# Patient Record
Sex: FEMALE | Race: WHITE | NOT HISPANIC OR LATINO | URBAN - METROPOLITAN AREA
[De-identification: names, ages, dates, MRNs, and addresses within clinical notes are randomized per-mention and may not be internally consistent; named-entity substitution may affect disease eponyms.]

---

## 2017-03-20 ENCOUNTER — APPOINTMENT (OUTPATIENT)
Dept: URBAN - METROPOLITAN AREA CLINIC 200 | Age: 72
Setting detail: DERMATOLOGY
End: 2017-03-30

## 2017-03-20 DIAGNOSIS — L57.8 OTHER SKIN CHANGES DUE TO CHRONIC EXPOSURE TO NONIONIZING RADIATION: ICD-10-CM

## 2017-03-20 DIAGNOSIS — L57.0 ACTINIC KERATOSIS: ICD-10-CM

## 2017-03-20 PROCEDURE — 17000 DESTRUCT PREMALG LESION: CPT

## 2017-03-20 PROCEDURE — OTHER COUNSELING: OTHER

## 2017-03-20 PROCEDURE — OTHER LIQUID NITROGEN: OTHER

## 2017-03-20 PROCEDURE — 99213 OFFICE O/P EST LOW 20 MIN: CPT | Mod: 25

## 2017-03-20 ASSESSMENT — LOCATION ZONE DERM
LOCATION ZONE: NOSE
LOCATION ZONE: TRUNK

## 2017-03-20 ASSESSMENT — LOCATION DETAILED DESCRIPTION DERM
LOCATION DETAILED: NASAL DORSUM
LOCATION DETAILED: INFERIOR THORACIC SPINE

## 2017-03-20 ASSESSMENT — LOCATION SIMPLE DESCRIPTION DERM
LOCATION SIMPLE: UPPER BACK
LOCATION SIMPLE: NOSE

## 2017-03-20 NOTE — PROCEDURE: LIQUID NITROGEN
Post-Care Instructions: I reviewed with the patient in detail post-care instructions. Patient is to wear sunprotection, and avoid picking at any of the treated lesions. Pt may apply Vaseline to crusted or scabbing areas.
Duration Of Freeze Thaw-Cycle (Seconds): 10
Detail Level: Detailed
Render Post-Care Instructions In Note?: no
Consent: The patient's consent was obtained including but not limited to risks of crusting, scabbing, blistering, scarring, darker or lighter pigmentary change, recurrence, incomplete removal and infection.
Number Of Freeze-Thaw Cycles: 2 freeze-thaw cycles

## 2017-09-18 ENCOUNTER — APPOINTMENT (OUTPATIENT)
Dept: URBAN - METROPOLITAN AREA CLINIC 200 | Age: 72
Setting detail: DERMATOLOGY
End: 2017-09-24

## 2017-09-18 DIAGNOSIS — L57.0 ACTINIC KERATOSIS: ICD-10-CM

## 2017-09-18 DIAGNOSIS — D22 MELANOCYTIC NEVI: ICD-10-CM

## 2017-09-18 DIAGNOSIS — L57.8 OTHER SKIN CHANGES DUE TO CHRONIC EXPOSURE TO NONIONIZING RADIATION: ICD-10-CM

## 2017-09-18 PROBLEM — D22.5 MELANOCYTIC NEVI OF TRUNK: Status: ACTIVE | Noted: 2017-09-18

## 2017-09-18 PROCEDURE — 17000 DESTRUCT PREMALG LESION: CPT

## 2017-09-18 PROCEDURE — OTHER COUNSELING: OTHER

## 2017-09-18 PROCEDURE — OTHER LIQUID NITROGEN: OTHER

## 2017-09-18 PROCEDURE — 99213 OFFICE O/P EST LOW 20 MIN: CPT | Mod: 25

## 2017-09-18 ASSESSMENT — LOCATION ZONE DERM
LOCATION ZONE: TRUNK
LOCATION ZONE: HAND

## 2017-09-18 ASSESSMENT — LOCATION SIMPLE DESCRIPTION DERM
LOCATION SIMPLE: CHEST
LOCATION SIMPLE: LEFT HAND

## 2017-09-18 ASSESSMENT — LOCATION DETAILED DESCRIPTION DERM
LOCATION DETAILED: LEFT ULNAR DORSAL HAND
LOCATION DETAILED: UPPER STERNUM

## 2017-12-14 ENCOUNTER — APPOINTMENT (OUTPATIENT)
Dept: URBAN - METROPOLITAN AREA CLINIC 200 | Age: 72
Setting detail: DERMATOLOGY
End: 2017-12-15

## 2017-12-14 DIAGNOSIS — D485 NEOPLASM OF UNCERTAIN BEHAVIOR OF SKIN: ICD-10-CM

## 2017-12-14 PROBLEM — D48.5 NEOPLASM OF UNCERTAIN BEHAVIOR OF SKIN: Status: ACTIVE | Noted: 2017-12-14

## 2017-12-14 PROCEDURE — OTHER BIOPSY BY SHAVE METHOD: OTHER

## 2017-12-14 PROCEDURE — OTHER RECOMMENDATIONS: OTHER

## 2017-12-14 PROCEDURE — 11100: CPT

## 2017-12-14 ASSESSMENT — LOCATION ZONE DERM
LOCATION ZONE: NOSE
LOCATION ZONE: MUCOUS_MEMBRANE

## 2017-12-14 ASSESSMENT — LOCATION DETAILED DESCRIPTION DERM
LOCATION DETAILED: NASAL DORSUM
LOCATION DETAILED: LEFT NARES

## 2017-12-14 ASSESSMENT — LOCATION SIMPLE DESCRIPTION DERM
LOCATION SIMPLE: NOSE
LOCATION SIMPLE: LEFT NARES

## 2017-12-14 NOTE — PROCEDURE: BIOPSY BY SHAVE METHOD
Wound Care: Vaseline
Bill For Surgical Tray: no
Size Of Lesion In Cm: 0.2
Hemostasis: Drysol
Additional Anesthesia Volume In Cc (Will Not Render If 0): 0
Billing Type: Third-Party Bill
Biopsy Type: H and E
Anesthesia Type: 1% lidocaine with epinephrine
Biopsy Method: Personna blade
Detail Level: Detailed
Type Of Destruction Used: Curettage
Anesthesia Volume In Cc (Will Not Render If 0): 0.1
Post-Care Instructions: I reviewed with the patient in detail post-care instructions. Patient is to keep the biopsy site dry overnight, and then apply bacitracin twice daily until healed. Patient may apply hydrogen peroxide soaks to remove any crusting.
consent was obtained and risks were reviewed including but not limited to scarring, infection, bleeding, scabbing, incomplete removal, nerve damage and allergy to anesthesia.
Dressing: bandage
Notification Instructions: Patient will be notified of biopsy results. However, patient instructed to call the office if not contacted within 2 weeks.

## 2017-12-14 NOTE — PROCEDURE: RECOMMENDATIONS
Recommendations (Free Text): REFER TO DR. ARZOLA ENT FOR EVALUATION.  FAMILY HISTORY OF INTRANASAL BCC PER PT. MOTHER AND BROTHER
Detail Level: Zone
Recommendation Preamble: The following recommendations were made during the visit:

## 2018-03-19 ENCOUNTER — APPOINTMENT (OUTPATIENT)
Dept: URBAN - METROPOLITAN AREA CLINIC 200 | Age: 73
Setting detail: DERMATOLOGY
End: 2018-03-28

## 2018-03-19 DIAGNOSIS — L57.0 ACTINIC KERATOSIS: ICD-10-CM

## 2018-03-19 DIAGNOSIS — L57.8 OTHER SKIN CHANGES DUE TO CHRONIC EXPOSURE TO NONIONIZING RADIATION: ICD-10-CM

## 2018-03-19 PROCEDURE — 99213 OFFICE O/P EST LOW 20 MIN: CPT | Mod: 25

## 2018-03-19 PROCEDURE — OTHER LIQUID NITROGEN: OTHER

## 2018-03-19 PROCEDURE — OTHER COUNSELING: OTHER

## 2018-03-19 PROCEDURE — 17000 DESTRUCT PREMALG LESION: CPT

## 2018-03-19 PROCEDURE — OTHER MIPS QUALITY: OTHER

## 2018-03-19 PROCEDURE — 17003 DESTRUCT PREMALG LES 2-14: CPT

## 2018-03-19 ASSESSMENT — LOCATION DETAILED DESCRIPTION DERM
LOCATION DETAILED: LEFT MEDIAL SUPERIOR CHEST
LOCATION DETAILED: RIGHT NASAL DORSUM
LOCATION DETAILED: UPPER STERNUM

## 2018-03-19 ASSESSMENT — LOCATION SIMPLE DESCRIPTION DERM
LOCATION SIMPLE: NOSE
LOCATION SIMPLE: CHEST

## 2018-03-19 ASSESSMENT — LOCATION ZONE DERM
LOCATION ZONE: NOSE
LOCATION ZONE: TRUNK

## 2018-03-19 ASSESSMENT — PAIN INTENSITY VAS: HOW INTENSE IS YOUR PAIN 0 BEING NO PAIN, 10 BEING THE MOST SEVERE PAIN POSSIBLE?: NO PAIN

## 2018-09-10 ENCOUNTER — APPOINTMENT (OUTPATIENT)
Dept: URBAN - METROPOLITAN AREA CLINIC 200 | Age: 73
Setting detail: DERMATOLOGY
End: 2018-09-21

## 2018-09-10 DIAGNOSIS — L57.0 ACTINIC KERATOSIS: ICD-10-CM

## 2018-09-10 DIAGNOSIS — L57.8 OTHER SKIN CHANGES DUE TO CHRONIC EXPOSURE TO NONIONIZING RADIATION: ICD-10-CM

## 2018-09-10 DIAGNOSIS — Z85.820 PERSONAL HISTORY OF MALIGNANT MELANOMA OF SKIN: ICD-10-CM

## 2018-09-10 PROBLEM — Z85.828 PERSONAL HISTORY OF OTHER MALIGNANT NEOPLASM OF SKIN: Status: ACTIVE | Noted: 2018-09-10

## 2018-09-10 PROCEDURE — 99213 OFFICE O/P EST LOW 20 MIN: CPT | Mod: 25

## 2018-09-10 PROCEDURE — OTHER LIQUID NITROGEN: OTHER

## 2018-09-10 PROCEDURE — 17003 DESTRUCT PREMALG LES 2-14: CPT

## 2018-09-10 PROCEDURE — OTHER COUNSELING: OTHER

## 2018-09-10 PROCEDURE — 17000 DESTRUCT PREMALG LESION: CPT

## 2018-09-10 ASSESSMENT — LOCATION DETAILED DESCRIPTION DERM
LOCATION DETAILED: RIGHT DISTAL POSTERIOR UPPER ARM
LOCATION DETAILED: RIGHT UPPER CUTANEOUS LIP
LOCATION DETAILED: LEFT MEDIAL SUPERIOR CHEST

## 2018-09-10 ASSESSMENT — LOCATION ZONE DERM
LOCATION ZONE: ARM
LOCATION ZONE: TRUNK
LOCATION ZONE: LIP

## 2018-09-10 ASSESSMENT — PAIN INTENSITY VAS: HOW INTENSE IS YOUR PAIN 0 BEING NO PAIN, 10 BEING THE MOST SEVERE PAIN POSSIBLE?: NO PAIN

## 2018-09-10 ASSESSMENT — LOCATION SIMPLE DESCRIPTION DERM
LOCATION SIMPLE: RIGHT POSTERIOR UPPER ARM
LOCATION SIMPLE: CHEST
LOCATION SIMPLE: RIGHT LIP

## 2018-10-25 RX ORDER — VALACYCLOVIR HYDROCHLORIDE 500 MG/1
500 TABLET, FILM COATED ORAL AS NEEDED
COMMUNITY
Start: 2018-09-20 | End: 2019-11-19 | Stop reason: SDUPTHER

## 2018-10-25 RX ORDER — DESLORATADINE 5 MG/1
5 TABLET ORAL DAILY
COMMUNITY
Start: 2018-08-27 | End: 2019-01-15 | Stop reason: SDUPTHER

## 2018-10-25 RX ORDER — METOPROLOL SUCCINATE 50 MG/1
50 TABLET, EXTENDED RELEASE ORAL DAILY
COMMUNITY
Start: 2018-08-27 | End: 2019-01-15 | Stop reason: SDUPTHER

## 2018-10-25 RX ORDER — TIMOLOL MALEATE 5 MG/ML
1 SOLUTION OPHTHALMIC DAILY
COMMUNITY
Start: 2018-08-27

## 2018-10-25 RX ORDER — ATORVASTATIN CALCIUM 10 MG/1
10 TABLET, FILM COATED ORAL NIGHTLY
COMMUNITY
Start: 2018-08-27 | End: 2019-01-15 | Stop reason: SDUPTHER

## 2018-10-25 RX ORDER — LATANOPROST 50 UG/ML
1 SOLUTION/ DROPS OPHTHALMIC DAILY
COMMUNITY
Start: 2018-08-27

## 2018-11-05 ENCOUNTER — HOSPITAL ENCOUNTER (OUTPATIENT)
Dept: CARDIOLOGY | Facility: HOSPITAL | Age: 73
Discharge: HOME | End: 2018-11-05
Attending: OPHTHALMOLOGY
Payer: MEDICARE

## 2018-11-05 ENCOUNTER — APPOINTMENT (OUTPATIENT)
Dept: PREADMISSION TESTING | Facility: HOSPITAL | Age: 73
End: 2018-11-05
Attending: OPHTHALMOLOGY
Payer: MEDICARE

## 2018-11-05 ENCOUNTER — APPOINTMENT (OUTPATIENT)
Dept: LAB | Facility: HOSPITAL | Age: 73
End: 2018-11-05
Attending: OPHTHALMOLOGY
Payer: MEDICARE

## 2018-11-05 ENCOUNTER — TRANSCRIBE ORDERS (OUTPATIENT)
Dept: LAB | Facility: HOSPITAL | Age: 73
End: 2018-11-05

## 2018-11-05 VITALS
HEIGHT: 66 IN | HEART RATE: 62 BPM | SYSTOLIC BLOOD PRESSURE: 124 MMHG | TEMPERATURE: 98 F | DIASTOLIC BLOOD PRESSURE: 60 MMHG | WEIGHT: 175 LBS | RESPIRATION RATE: 16 BRPM | BODY MASS INDEX: 28.12 KG/M2

## 2018-11-05 DIAGNOSIS — H25.12 AGE-RELATED NUCLEAR CATARACT OF LEFT EYE: ICD-10-CM

## 2018-11-05 DIAGNOSIS — Z01.818 PREOP TESTING: Primary | ICD-10-CM

## 2018-11-05 DIAGNOSIS — H25.12 AGE-RELATED NUCLEAR CATARACT OF LEFT EYE: Primary | ICD-10-CM

## 2018-11-05 PROBLEM — H26.9 LEFT CATARACT: Status: ACTIVE | Noted: 2018-11-05

## 2018-11-05 LAB
ANION GAP SERPL CALC-SCNC: 5 MEQ/L (ref 3–15)
BUN SERPL-MCNC: 24 MG/DL (ref 8–20)
CALCIUM SERPL-MCNC: 9.9 MG/DL (ref 8.9–10.3)
CHLORIDE SERPL-SCNC: 103 MEQ/L (ref 98–109)
CO2 SERPL-SCNC: 30 MEQ/L (ref 22–32)
CREAT SERPL-MCNC: 0.9 MG/DL (ref 0.6–1.1)
ERYTHROCYTE [DISTWIDTH] IN BLOOD BY AUTOMATED COUNT: 12.7 % (ref 11.7–14.4)
GFR SERPL CREATININE-BSD FRML MDRD: >60 ML/MIN/1.73M*2
GLUCOSE SERPL-MCNC: 122 MG/DL (ref 70–99)
HCT VFR BLDCO AUTO: 41.7 % (ref 35–45)
HGB BLD-MCNC: 13.9 G/DL (ref 11.8–15.7)
MCH RBC QN AUTO: 29.8 PG (ref 28–33.2)
MCHC RBC AUTO-ENTMCNC: 33.3 G/DL (ref 32.2–35.5)
MCV RBC AUTO: 89.3 FL (ref 83–98)
PDW BLD AUTO: 10.6 FL (ref 9.4–12.3)
PLATELET # BLD AUTO: 204 K/UL (ref 150–369)
POTASSIUM SERPL-SCNC: 4.1 MEQ/L (ref 3.6–5.1)
RBC # BLD AUTO: 4.67 M/UL (ref 3.93–5.22)
SODIUM SERPL-SCNC: 138 MEQ/L (ref 136–144)
WBC # BLD AUTO: 7.21 K/UL (ref 3.8–10.5)

## 2018-11-05 PROCEDURE — 36415 COLL VENOUS BLD VENIPUNCTURE: CPT | Mod: GA

## 2018-11-05 PROCEDURE — 80048 BASIC METABOLIC PNL TOTAL CA: CPT

## 2018-11-05 PROCEDURE — 93005 ELECTROCARDIOGRAM TRACING: CPT

## 2018-11-05 PROCEDURE — 85027 COMPLETE CBC AUTOMATED: CPT | Mod: GZ

## 2018-11-05 RX ORDER — ASPIRIN 81 MG/1
81 TABLET ORAL 2 TIMES WEEKLY
COMMUNITY

## 2018-11-05 ASSESSMENT — ENCOUNTER SYMPTOMS
SHORTNESS OF BREATH: 0
NUMBNESS: 0
DYSURIA: 0
HEMATOLOGIC/LYMPHATIC NEGATIVE: 1
HEMATURIA: 0
WEAKNESS: 0
CHILLS: 0
VOMITING: 0
ENDOCRINE COMMENTS: PREDIABETIC
PALPITATIONS: 1
CHEST TIGHTNESS: 0
APNEA: 0
PSYCHIATRIC NEGATIVE: 1
ROS GI COMMENTS: GERD CONTROLLED
ABDOMINAL PAIN: 0
ENDOCRINE NEGATIVE: 1
MUSCULOSKELETAL NEGATIVE: 1
HEADACHES: 1
FEVER: 0
WOUND: 0
NAUSEA: 0

## 2018-11-05 ASSESSMENT — PAIN SCALES - GENERAL: PAINLEVEL: 0-NO PAIN

## 2018-11-05 NOTE — PRE-PROCEDURE INSTRUCTIONS
1. We will call you between 4pm to 7pm on the date before your surgery to determine that arrival time for your procedure.   2. Please report to (the hospital will tell you when they call) on the day of your procedure.   3. Please follow the following fasting guidelines:   · Nothing to eat or drink 8 hours prior to arrival   4. Early on the morning of the procedure please take your usual dose of the listed medications with a sip of water:  Toprol, Zantac   5. Other Instructions: follow your surgeon's eye drop instructions   6. If you develop a cold, cough, fever, rash, or other symptom prior to the data of the procedure, please report it to your physician immediately.   7. If you need to cancel the procedure for any reason, please contact your physician or call the unit listed above.   8. Make arrangements to have someone drive you home from the procedure. If you have not arranged for transportation home, your surgery may be cancelled.    9. You may not take public transportation unless accompanied by a responsible person.   10. You may not drive a car or operate complex or potentially dangerous machinery for 24 hours following anesthesia and/or sedation.   11. If it is medically necessary for you to have a longer stay, you will be informed as soon as the decision is made.   12. Do not wear or being anything of value to the hospital including jewelry of any kind. Do not wear make-up or contact lenses. DO bring your glasses and hearing aid.   13. Dress in comfortable clothes.   14.  If instructed, please bring a copy of your Advanced Directive (Living Will/Durable Power of ) on the day of your procedure.      Pre operative instructions given as per protocol.  Form explained by: MARIANNE Farrell     I have read and understand the above information. I have had sufficient opportunity to ask questions I might have and they have been answered to my satisfaction. I agree to comply with the Patient  Responsibilities listed above and have received a copy of this form.

## 2018-11-05 NOTE — H&P
"Chief complaint: left cataract  HPI: 74 yo female with mature left cataract, she states she has difficulty focusing with her left eye, she notes glare and stopped driving at night, she states she has a history of glaucoma and retina cysts left eye  Allergies:   Allergies   Allergen Reactions   • Adhesive Tape-Silicones Other (see comments)     \"Skin Burn\"   • Ambien [Zolpidem] Other (see comments)     Amnesia   • Dexilant [Dexlansoprazole] GI intolerance   • Sulfa (Sulfonamide Antibiotics) Rash     Patient's Meds:   Current Outpatient Prescriptions:   •  aspirin 81 mg enteric coated tablet, Take 81 mg by mouth 2 (two) times a week (Mon, Thu). Monday and Thursday, Disp: , Rfl:   •  ranitidine (ZANTAC) 300 mg tablet, Take 300 mg by mouth 2 (two) times a day., Disp: , Rfl:   •  atorvastatin (LIPITOR) 10 mg tablet, Take 10 mg by mouth nightly.  , Disp: , Rfl:   •  desloratadine (CLARINEX) 5 mg tablet, Take 5 mg by mouth daily.  , Disp: , Rfl:   •  latanoprost (XALATAN) 0.005 % ophthalmic solution, Administer 1 drop into both eyes daily.  , Disp: , Rfl:   •  timolol (TIMOPTIC-XR) 0.5 % ophthalmic gel-forming, Administer 1 drop into both eyes daily.  , Disp: , Rfl:   •  TOPROL XL 50 mg 24 hr tablet, Take 50 mg by mouth daily.  , Disp: , Rfl:   •  valACYclovir (VALTREX) 500 mg tablet, Take 500 mg by mouth as needed.  , Disp: , Rfl:   Medical History:   Past Medical History:   Diagnosis Date   • Allergic rhinitis    • Calculus of kidney     Spontaneously passed   • Cataracts, bilateral    • Chronic pain     Back Pain   • Esophagitis, reflux    • Essential hypertension, benign    • Fatty liver    • Glaucoma    • H/O cardiovascular stress test    • History of colon polyps    • Impaired fasting glucose     Blood Glucose usually 115-124 in am   • Macular degeneration    • Malignant melanoma (CMS/HCC) (HCC) 2010    Left Arm   • Migraines     Last migraine 30 years ago   • Overweight    • Paroxysmal supraventricular tachycardia " (CMS/HCC)     Controlled with Toprol   • Prediabetes    • Pure hypercholesterolemia    • Skin cancer     Squamous cell Face   • Small bowel obstruction (CMS/HCC) (HCC)     Caused by Scar tissue from Tubal Ligation   • UTI (urinary tract infection) 04/2018    Develped an SVT with UTI and followed by cardiology     Surgical History:   Past Surgical History:   Procedure Laterality Date   • ABDOMINAL SURGERY  07/2012    Small Bowel Resection   • CATARACT EXTRACTION Right 01/2016   • KNEE CARTILAGE SURGERY Left 11/2008   • SKIN CANCER EXCISION Left 2010    Melanoma   • TUBAL LIGATION  1985     Social History:   Social History     Social History   • Marital status:      Spouse name: N/A   • Number of children: N/A   • Years of education: N/A     Social History Main Topics   • Smoking status: Never Smoker   • Smokeless tobacco: Never Used   • Alcohol use No   • Drug use: No   • Sexual activity: Not Asked     Other Topics Concern   • None     Social History Narrative   • None     Review of Systems   Constitutional: Negative for chills and fever.   HENT: Negative for dental problem and hearing loss.         Infrequent oral herpes lesions- takes Valtrex prn   Eyes: Positive for visual disturbance (left eye see HPI).   Respiratory: Negative for apnea, chest tightness and shortness of breath.    Cardiovascular: Positive for palpitations (arrhythmia- SVT controlled with Toprol). Negative for chest pain and leg swelling.   Gastrointestinal: Negative for abdominal pain, nausea and vomiting.        GERD controlled   Endocrine: Negative.         Prediabetic   Genitourinary: Negative for dysuria and hematuria.   Musculoskeletal: Negative.    Skin: Negative for wound.   Neurological: Positive for headaches (migraines 30 years ago). Negative for weakness and numbness.   Hematological: Negative.    Psychiatric/Behavioral: Negative.      Vitals:   Vitals:    11/05/18 1357   BP: 124/60   Pulse: 62   Resp: 16   Temp: 36.7 °C (98  °F)     Body mass index is 28.25 kg/m².  Physical Exam   Constitutional: She is oriented to person, place, and time. She appears well-developed and well-nourished.   HENT:   Head: Normocephalic and atraumatic.   Mouth/Throat: Oropharynx is clear and moist.   Eyes: Conjunctivae are normal. Pupils are equal, round, and reactive to light.   Neck: Normal range of motion. Neck supple. No thyromegaly present.   Cardiovascular: Normal rate, regular rhythm, normal heart sounds and intact distal pulses.    No murmur heard.  Pulmonary/Chest: Effort normal and breath sounds normal. No respiratory distress.   Abdominal: Soft. Bowel sounds are normal. She exhibits no mass. There is no tenderness.   Musculoskeletal: Normal range of motion.   Lymphadenopathy:     She has no cervical adenopathy.   Neurological: She is alert and oriented to person, place, and time.   Skin: Skin is warm and dry.   Psychiatric: She has a normal mood and affect. Her behavior is normal. Thought content normal.   Vitals reviewed.  labs pending  EKG SR 60 BPM LAD possible LAFB LVH  Patient Active Problem List   Diagnosis   • Allergic rhinitis   • Calculus of kidney   • Chronic pain   • Esophagitis, reflux   • Essential hypertension, benign   • Fatty liver   • History of colon polyps   • Impaired fasting glucose   • Overweight   • Paroxysmal supraventricular tachycardia (CMS/HCC)   • Pure hypercholesterolemia   • Malignant melanoma (CMS/HCC) (HCC)   • Left cataract         Assessment/Plan:  Left cataract removal IOL implant  Medical clearance  NPO for surgery

## 2018-11-06 ENCOUNTER — CONSULT (OUTPATIENT)
Dept: PRIMARY CARE | Facility: CLINIC | Age: 73
End: 2018-11-06
Payer: MEDICARE

## 2018-11-06 VITALS
HEART RATE: 65 BPM | WEIGHT: 176 LBS | DIASTOLIC BLOOD PRESSURE: 82 MMHG | BODY MASS INDEX: 29.32 KG/M2 | OXYGEN SATURATION: 98 % | SYSTOLIC BLOOD PRESSURE: 132 MMHG | HEIGHT: 65 IN

## 2018-11-06 DIAGNOSIS — K21.00 ESOPHAGITIS, REFLUX: ICD-10-CM

## 2018-11-06 DIAGNOSIS — Z01.818 PREOPERATIVE EVALUATION TO RULE OUT SURGICAL CONTRAINDICATION: ICD-10-CM

## 2018-11-06 DIAGNOSIS — E78.00 PURE HYPERCHOLESTEROLEMIA: ICD-10-CM

## 2018-11-06 DIAGNOSIS — H25.12 AGE-RELATED NUCLEAR CATARACT OF LEFT EYE: ICD-10-CM

## 2018-11-06 DIAGNOSIS — R73.01 IMPAIRED FASTING GLUCOSE: Primary | ICD-10-CM

## 2018-11-06 DIAGNOSIS — N20.0 CALCULUS OF KIDNEY: ICD-10-CM

## 2018-11-06 LAB
ATRIAL RATE: 60
P AXIS: 4
PR INTERVAL: 158
QRS DURATION: 106
QT INTERVAL: 442
QTC CALCULATION(BAZETT): 442
R AXIS: -44
T WAVE AXIS: -7
VENTRICULAR RATE: 60

## 2018-11-06 PROCEDURE — 99215 OFFICE O/P EST HI 40 MIN: CPT | Performed by: INTERNAL MEDICINE

## 2018-11-06 ASSESSMENT — ENCOUNTER SYMPTOMS
BACK PAIN: 1
CARDIOVASCULAR NEGATIVE: 1
PSYCHIATRIC NEGATIVE: 1
PHOTOPHOBIA: 1
NEUROLOGICAL NEGATIVE: 1
CONSTITUTIONAL NEGATIVE: 1
RESPIRATORY NEGATIVE: 1
HEMATOLOGIC/LYMPHATIC NEGATIVE: 1
GASTROINTESTINAL NEGATIVE: 1

## 2018-11-06 NOTE — PROGRESS NOTES
"    [facility]  Dr. Veronica Mars  [address], [address]  [place] [address]  Phone: [phone]  Fax: [phone]      Visit Date: 11/6/18    Patient ID: Bev Martinez is a 73 y.o. female.  Surgeon: Dr. Gumaro Ramires  Facility: [facility]  Fax: [phone]    Chief Complaint: Pre-op Exam (left eye cataract with IOL per Dr. Gumaro Ramires at [place] on 11/12/18)      Patient Active Problem List   Diagnosis   • Allergic rhinitis   • Calculus of kidney   • Esophagitis, reflux   • Fatty liver   • History of colon polyps   • Impaired fasting glucose   • Overweight   • Paroxysmal supraventricular tachycardia (CMS/HCC)   • Pure hypercholesterolemia   • Malignant melanoma (CMS/HCC) (HCC)   • Left cataract       Planning a left cataract extraction and IOL implantation. She has releaiely poor vision in the left eye related to a retinal problem. It is hoped that the decreasing glare related to the cataract will improve her vision in this eye.              Current Outpatient Prescriptions:   •  aspirin 81 mg enteric coated tablet, Take 81 mg by mouth 2 (two) times a week (Mon, Thu). Monday and Thursday, Disp: , Rfl:   •  atorvastatin (LIPITOR) 10 mg tablet, Take 10 mg by mouth nightly.  , Disp: , Rfl:   •  desloratadine (CLARINEX) 5 mg tablet, Take 5 mg by mouth daily.  , Disp: , Rfl:   •  latanoprost (XALATAN) 0.005 % ophthalmic solution, Administer 1 drop into both eyes daily.  , Disp: , Rfl:   •  timolol (TIMOPTIC-XR) 0.5 % ophthalmic gel-forming, Administer 1 drop into both eyes daily.  , Disp: , Rfl:   •  TOPROL XL 50 mg 24 hr tablet, Take 50 mg by mouth daily.  , Disp: , Rfl:   •  valACYclovir (VALTREX) 500 mg tablet, Take 500 mg by mouth as needed.  , Disp: , Rfl:   •  ranitidine (ZANTAC) 300 mg tablet, TAKE 1 TABLET TWICE A DAY, Disp: 180 tablet, Rfl: 3    Allergies   Allergen Reactions   • Adhesive Tape-Silicones Other (see comments)     \"Skin Burn\" "   • Ambien [Zolpidem] Other (see comments)     Amnesia   • Dexilant [Dexlansoprazole] GI intolerance   • Sulfa (Sulfonamide Antibiotics) Rash       Past Medical History:   Diagnosis Date   • Allergic rhinitis    • Calculus of kidney     Spontaneously passed   • Cataracts, bilateral    • Chronic pain     Back Pain   • Esophagitis, reflux    • Essential hypertension, benign    • Fatty liver    • Glaucoma    • H/O cardiovascular stress test    • History of colon polyps    • Impaired fasting glucose     Blood Glucose usually 115-124 in am   • Macular degeneration    • Malignant melanoma (CMS/HCC) (HCC) 2010    Left Arm   • Migraines     Last migraine 30 years ago   • Overweight    • Paroxysmal supraventricular tachycardia (CMS/HCC)     Controlled with Toprol   • Prediabetes    • Pure hypercholesterolemia    • Skin cancer     Squamous cell Face   • Small bowel obstruction (CMS/HCC) (HCC)     Caused by Scar tissue from Tubal Ligation   • UTI (urinary tract infection) 04/2018    Develped an SVT with UTI and followed by cardiology       Past Surgical History:   Procedure Laterality Date   • ABDOMINAL SURGERY  07/2012    Small Bowel Resection   • CATARACT EXTRACTION Right 01/2016   • KNEE CARTILAGE SURGERY Left 11/2008   • SKIN CANCER EXCISION Left 2009    Melanoma   • TUBAL LIGATION         Social History     Social History   • Marital status:      Spouse name: N/A   • Number of children: N/A   • Years of education: N/A     Occupational History   • retired - GYN CRNP      Social History Main Topics   • Smoking status: Never Smoker   • Smokeless tobacco: Never Used   • Alcohol use No   • Drug use: No   • Sexual activity: Not on file     Other Topics Concern   • Not on file     Social History Narrative   • No narrative on file       Family History   Problem Relation Age of Onset   • Heart disease Mother    • COPD Father    • Glaucoma Sister    • Heart disease Brother    • Diabetes Brother    • Glaucoma Brother    •  "Stroke Maternal Grandfather    • Glaucoma Sister    • Eating disorder Sister    • Glaucoma Brother         Review of Systems   Constitutional: Negative.    HENT: Negative.  Negative for dental problem (has crowns).    Eyes: Positive for photophobia (just glare from headlights) and visual disturbance (chronic left eye). Negative for pain, discharge, redness and itching.   Respiratory: Negative.    Cardiovascular: Negative.    Gastrointestinal: Negative.    Endocrine: Positive for heat intolerance (not new). Negative for cold intolerance, polydipsia and polyuria.   Genitourinary: Negative.    Musculoskeletal: Positive for back pain (occasionally). Negative for arthralgias, gait problem, joint swelling, myalgias, neck pain and neck stiffness.   Skin: Negative.    Allergic/Immunologic: Positive for environmental allergies. Negative for food allergies and immunocompromised state.   Neurological: Negative.    Hematological: Negative.    Psychiatric/Behavioral: Negative.        Vitals:    11/06/18 0813   BP: 132/82   Pulse: 65   SpO2: 98%   Weight: 79.8 kg (176 lb)   Height: 1.651 m (5' 5\")       Body mass index is 29.29 kg/m².      Physical Exam   Constitutional: She is oriented to person, place, and time. She appears well-nourished.   HENT:   Head: Normocephalic.   Right Ear: Hearing, tympanic membrane, external ear and ear canal normal.   Left Ear: Hearing, tympanic membrane, external ear and ear canal normal.   Nose: Nose normal.   Mouth/Throat: Uvula is midline, oropharynx is clear and moist and mucous membranes are normal. Normal dentition (bu crowns noted).   Eyes: Conjunctivae, EOM and lids are normal. Pupils are equal, round, and reactive to light.   Neck: Trachea normal. Neck supple. Normal carotid pulses present. Carotid bruit is not present. No thyromegaly present.   Cardiovascular: Normal rate, regular rhythm and normal heart sounds.  Exam reveals no gallop.    No murmur heard.  Pulmonary/Chest: Effort normal " and breath sounds normal. She has no wheezes. She has no rales.   Abdominal: Soft. Bowel sounds are normal. She exhibits no mass. There is no tenderness.   Musculoskeletal: Normal range of motion. She exhibits no edema.   Lymphadenopathy:     She has no cervical adenopathy.   Neurological: She is oriented to person, place, and time. Coordination normal.   Skin: Skin is warm and dry. No pallor.   Psychiatric: She has a normal mood and affect. Her behavior is normal. Judgment and thought content normal.   Vitals reviewed.         Assessment/Plan     Problem List Items Addressed This Visit        Other    Calculus of kidney    Relevant Orders    Urinalysis with Reflex Culture    Esophagitis, reflux     CBC normal 4/18.         Relevant Orders    CBC and differential    Impaired fasting glucose - Primary     A1c 5.9 in 4/18, CMP notable only for a FBS of 125.         Relevant Orders    Hemoglobin A1c    Pure hypercholesterolemia     At goal last check.         Relevant Orders    Comprehensive metabolic panel    Lipid panel    Left cataract     Patient is without cardiovascular complaints.  Her exam is stable.  Her EKG done at [facility] 11/5/18 is without significant change from previous tracings.  (The lab work ordered today is not expected to be done before the procedure.)  The patient appears to be at acceptable risk for the planned procedure.           Other Visit Diagnoses     Preoperative evaluation to rule out surgical contraindication              There are no Patient Instructions on file for this visit.    Return in about 6 months (around 5/6/2019).      Veronica Mars MD  11/8/2018

## 2018-11-07 ENCOUNTER — TELEPHONE (OUTPATIENT)
Dept: PRIMARY CARE | Facility: CLINIC | Age: 73
End: 2018-11-07

## 2018-11-08 ASSESSMENT — ENCOUNTER SYMPTOMS
EYE ITCHING: 0
EYE DISCHARGE: 0
JOINT SWELLING: 0
ARTHRALGIAS: 0
POLYDIPSIA: 0
MYALGIAS: 0
EYE REDNESS: 0
NECK PAIN: 0
NECK STIFFNESS: 0
EYE PAIN: 0

## 2018-11-08 NOTE — TELEPHONE ENCOUNTER
S/W pt-aware and also wanted you to know that when you were discussing her April labs with here-she did have a UTI at that time and perhaps that was the reason her glucose was elevated

## 2018-11-08 NOTE — ASSESSMENT & PLAN NOTE
Patient is without cardiovascular complaints.  Her exam is stable.  Her EKG done at [facility] 11/5/18 is without significant change from previous tracings.  (The lab work ordered today is not expected to be done before the procedure.)  The patient appears to be at acceptable risk for the planned procedure.

## 2018-11-12 ENCOUNTER — HOSPITAL ENCOUNTER (OUTPATIENT)
Facility: HOSPITAL | Age: 73
Setting detail: HOSPITAL OUTPATIENT SURGERY
Discharge: HOME | End: 2018-11-12
Attending: OPHTHALMOLOGY | Admitting: OPHTHALMOLOGY
Payer: MEDICARE

## 2018-11-12 ENCOUNTER — ANESTHESIA EVENT (OUTPATIENT)
Dept: OPERATING ROOM | Facility: HOSPITAL | Age: 73
Setting detail: HOSPITAL OUTPATIENT SURGERY
End: 2018-11-12
Payer: MEDICARE

## 2018-11-12 VITALS
SYSTOLIC BLOOD PRESSURE: 114 MMHG | RESPIRATION RATE: 16 BRPM | BODY MASS INDEX: 29.32 KG/M2 | TEMPERATURE: 97 F | DIASTOLIC BLOOD PRESSURE: 55 MMHG | OXYGEN SATURATION: 98 % | WEIGHT: 176 LBS | HEIGHT: 65 IN | HEART RATE: 66 BPM

## 2018-11-12 PROCEDURE — 63600000 HC DRUGS/DETAIL CODE: Performed by: OPHTHALMOLOGY

## 2018-11-12 PROCEDURE — 25000000 HC PHARMACY GENERAL: Performed by: OPHTHALMOLOGY

## 2018-11-12 PROCEDURE — 71000012 HC PACU PHASE 2 EA ADDL MIN: Performed by: OPHTHALMOLOGY

## 2018-11-12 PROCEDURE — 63600000 HC DRUGS/DETAIL CODE: Performed by: NURSE ANESTHETIST, CERTIFIED REGISTERED

## 2018-11-12 PROCEDURE — 63700000 HC SELF-ADMINISTRABLE DRUG: Performed by: OPHTHALMOLOGY

## 2018-11-12 PROCEDURE — 71000002 HC PACU PHASE 2 INITIAL 30MIN: Performed by: OPHTHALMOLOGY

## 2018-11-12 PROCEDURE — 08RK3JZ REPLACEMENT OF LEFT LENS WITH SYNTHETIC SUBSTITUTE, PERCUTANEOUS APPROACH: ICD-10-PCS | Performed by: OPHTHALMOLOGY

## 2018-11-12 PROCEDURE — 36000003 HC OR LEVEL 3 INITIAL 30MIN: Performed by: OPHTHALMOLOGY

## 2018-11-12 PROCEDURE — V2632 POST CHMBR INTRAOCULAR LENS: HCPCS | Performed by: OPHTHALMOLOGY

## 2018-11-12 PROCEDURE — 37000002 HC ANESTHESIA MAC: Performed by: OPHTHALMOLOGY

## 2018-11-12 DEVICE — LENS IOL SN60WF 23.0D: Type: IMPLANTABLE DEVICE | Status: FUNCTIONAL

## 2018-11-12 RX ORDER — ONDANSETRON HYDROCHLORIDE 2 MG/ML
INJECTION, SOLUTION INTRAVENOUS AS NEEDED
Status: DISCONTINUED | OUTPATIENT
Start: 2018-11-12 | End: 2018-11-12 | Stop reason: SURG

## 2018-11-12 RX ORDER — TOBRAMYCIN AND DEXAMETHASONE 3; 1 MG/ML; MG/ML
SUSPENSION/ DROPS OPHTHALMIC AS NEEDED
Status: DISCONTINUED | OUTPATIENT
Start: 2018-11-12 | End: 2018-11-12 | Stop reason: HOSPADM

## 2018-11-12 RX ORDER — FENTANYL CITRATE 50 UG/ML
INJECTION, SOLUTION INTRAMUSCULAR; INTRAVENOUS AS NEEDED
Status: DISCONTINUED | OUTPATIENT
Start: 2018-11-12 | End: 2018-11-12 | Stop reason: SURG

## 2018-11-12 RX ORDER — TETRACAINE HYDROCHLORIDE 5 MG/ML
1 SOLUTION OPHTHALMIC ONCE AS NEEDED
Status: DISCONTINUED | OUTPATIENT
Start: 2018-11-12 | End: 2018-11-12 | Stop reason: HOSPADM

## 2018-11-12 RX ORDER — SODIUM CHLORIDE, SODIUM LACTATE, POTASSIUM CHLORIDE, CALCIUM CHLORIDE 600; 310; 30; 20 MG/100ML; MG/100ML; MG/100ML; MG/100ML
INJECTION, SOLUTION INTRAVENOUS CONTINUOUS
Status: DISCONTINUED | OUTPATIENT
Start: 2018-11-12 | End: 2018-11-12 | Stop reason: HOSPADM

## 2018-11-12 RX ORDER — TETRACAINE HYDROCHLORIDE 5 MG/ML
1 SOLUTION OPHTHALMIC
Status: DISCONTINUED | OUTPATIENT
Start: 2018-11-12 | End: 2018-11-12 | Stop reason: HOSPADM

## 2018-11-12 RX ORDER — TROPICAMIDE 10 MG/ML
1 SOLUTION/ DROPS OPHTHALMIC
Status: DISCONTINUED | OUTPATIENT
Start: 2018-11-12 | End: 2018-11-12 | Stop reason: HOSPADM

## 2018-11-12 RX ORDER — ACETAMINOPHEN 325 MG/1
650 TABLET ORAL EVERY 4 HOURS PRN
Status: DISCONTINUED | OUTPATIENT
Start: 2018-11-12 | End: 2018-11-12 | Stop reason: HOSPADM

## 2018-11-12 RX ORDER — PILOCARPINE HYDROCHLORIDE 20 MG/ML
SOLUTION/ DROPS OPHTHALMIC AS NEEDED
Status: DISCONTINUED | OUTPATIENT
Start: 2018-11-12 | End: 2018-11-12 | Stop reason: HOSPADM

## 2018-11-12 RX ORDER — MIDAZOLAM HYDROCHLORIDE 2 MG/2ML
INJECTION, SOLUTION INTRAMUSCULAR; INTRAVENOUS AS NEEDED
Status: DISCONTINUED | OUTPATIENT
Start: 2018-11-12 | End: 2018-11-12 | Stop reason: SURG

## 2018-11-12 RX ORDER — PHENYLEPHRINE HYDROCHLORIDE 25 MG/ML
1 SOLUTION/ DROPS OPHTHALMIC
Status: DISCONTINUED | OUTPATIENT
Start: 2018-11-12 | End: 2018-11-12 | Stop reason: HOSPADM

## 2018-11-12 RX ORDER — CYCLOPENTOLATE HYDROCHLORIDE 10 MG/ML
1 SOLUTION/ DROPS OPHTHALMIC
Status: DISCONTINUED | OUTPATIENT
Start: 2018-11-12 | End: 2018-11-12 | Stop reason: HOSPADM

## 2018-11-12 RX ORDER — EPINEPHRINE 1 MG/ML
INJECTION, SOLUTION INTRAMUSCULAR; SUBCUTANEOUS AS NEEDED
Status: DISCONTINUED | OUTPATIENT
Start: 2018-11-12 | End: 2018-11-12 | Stop reason: HOSPADM

## 2018-11-12 RX ADMIN — CYCLOPENTOLATE HYDROCHLORIDE 1 DROP: 10 SOLUTION OPHTHALMIC at 09:09

## 2018-11-12 RX ADMIN — PHENYLEPHRINE HYDROCHLORIDE 1 DROP: 2.5 SOLUTION/ DROPS OPHTHALMIC at 09:09

## 2018-11-12 RX ADMIN — MIDAZOLAM HYDROCHLORIDE 2 MG: 1 INJECTION, SOLUTION INTRAMUSCULAR; INTRAVENOUS at 09:50

## 2018-11-12 RX ADMIN — ONDANSETRON 4 MG: 2 INJECTION INTRAMUSCULAR; INTRAVENOUS at 10:00

## 2018-11-12 RX ADMIN — TROPICAMIDE 1 DROP: 10 SOLUTION/ DROPS OPHTHALMIC at 09:09

## 2018-11-12 RX ADMIN — SODIUM CHLORIDE, POTASSIUM CHLORIDE, SODIUM LACTATE AND CALCIUM CHLORIDE: 600; 310; 30; 20 INJECTION, SOLUTION INTRAVENOUS at 09:20

## 2018-11-12 RX ADMIN — TETRACAINE HYDROCHLORIDE 1 DROP: 5 SOLUTION OPHTHALMIC at 09:09

## 2018-11-12 RX ADMIN — FENTANYL CITRATE 50 MCG: 50 INJECTION, SOLUTION INTRAMUSCULAR; INTRAVENOUS at 10:00

## 2018-11-12 ASSESSMENT — PAIN - FUNCTIONAL ASSESSMENT
PAIN_FUNCTIONAL_ASSESSMENT: NO/DENIES PAIN

## 2018-11-12 ASSESSMENT — ENCOUNTER SYMPTOMS: HEADACHES: 1

## 2018-11-12 NOTE — H&P (VIEW-ONLY)
"Chief complaint: left cataract  HPI: 72 yo female with mature left cataract, she states she has difficulty focusing with her left eye, she notes glare and stopped driving at night, she states she has a history of glaucoma and retina cysts left eye  Allergies:   Allergies   Allergen Reactions   • Adhesive Tape-Silicones Other (see comments)     \"Skin Burn\"   • Ambien [Zolpidem] Other (see comments)     Amnesia   • Dexilant [Dexlansoprazole] GI intolerance   • Sulfa (Sulfonamide Antibiotics) Rash     Patient's Meds:   Current Outpatient Prescriptions:   •  aspirin 81 mg enteric coated tablet, Take 81 mg by mouth 2 (two) times a week (Mon, Thu). Monday and Thursday, Disp: , Rfl:   •  ranitidine (ZANTAC) 300 mg tablet, Take 300 mg by mouth 2 (two) times a day., Disp: , Rfl:   •  atorvastatin (LIPITOR) 10 mg tablet, Take 10 mg by mouth nightly.  , Disp: , Rfl:   •  desloratadine (CLARINEX) 5 mg tablet, Take 5 mg by mouth daily.  , Disp: , Rfl:   •  latanoprost (XALATAN) 0.005 % ophthalmic solution, Administer 1 drop into both eyes daily.  , Disp: , Rfl:   •  timolol (TIMOPTIC-XR) 0.5 % ophthalmic gel-forming, Administer 1 drop into both eyes daily.  , Disp: , Rfl:   •  TOPROL XL 50 mg 24 hr tablet, Take 50 mg by mouth daily.  , Disp: , Rfl:   •  valACYclovir (VALTREX) 500 mg tablet, Take 500 mg by mouth as needed.  , Disp: , Rfl:   Medical History:   Past Medical History:   Diagnosis Date   • Allergic rhinitis    • Calculus of kidney     Spontaneously passed   • Cataracts, bilateral    • Chronic pain     Back Pain   • Esophagitis, reflux    • Essential hypertension, benign    • Fatty liver    • Glaucoma    • H/O cardiovascular stress test    • History of colon polyps    • Impaired fasting glucose     Blood Glucose usually 115-124 in am   • Macular degeneration    • Malignant melanoma (CMS/HCC) (HCC) 2010    Left Arm   • Migraines     Last migraine 30 years ago   • Overweight    • Paroxysmal supraventricular tachycardia " (CMS/HCC)     Controlled with Toprol   • Prediabetes    • Pure hypercholesterolemia    • Skin cancer     Squamous cell Face   • Small bowel obstruction (CMS/HCC) (HCC)     Caused by Scar tissue from Tubal Ligation   • UTI (urinary tract infection) 04/2018    Develped an SVT with UTI and followed by cardiology     Surgical History:   Past Surgical History:   Procedure Laterality Date   • ABDOMINAL SURGERY  07/2012    Small Bowel Resection   • CATARACT EXTRACTION Right 01/2016   • KNEE CARTILAGE SURGERY Left 11/2008   • SKIN CANCER EXCISION Left 2010    Melanoma   • TUBAL LIGATION  1985     Social History:   Social History     Social History   • Marital status:      Spouse name: N/A   • Number of children: N/A   • Years of education: N/A     Social History Main Topics   • Smoking status: Never Smoker   • Smokeless tobacco: Never Used   • Alcohol use No   • Drug use: No   • Sexual activity: Not Asked     Other Topics Concern   • None     Social History Narrative   • None     Review of Systems   Constitutional: Negative for chills and fever.   HENT: Negative for dental problem and hearing loss.         Infrequent oral herpes lesions- takes Valtrex prn   Eyes: Positive for visual disturbance (left eye see HPI).   Respiratory: Negative for apnea, chest tightness and shortness of breath.    Cardiovascular: Positive for palpitations (arrhythmia- SVT controlled with Toprol). Negative for chest pain and leg swelling.   Gastrointestinal: Negative for abdominal pain, nausea and vomiting.        GERD controlled   Endocrine: Negative.         Prediabetic   Genitourinary: Negative for dysuria and hematuria.   Musculoskeletal: Negative.    Skin: Negative for wound.   Neurological: Positive for headaches (migraines 30 years ago). Negative for weakness and numbness.   Hematological: Negative.    Psychiatric/Behavioral: Negative.      Vitals:   Vitals:    11/05/18 1357   BP: 124/60   Pulse: 62   Resp: 16   Temp: 36.7 °C (98  °F)     Body mass index is 28.25 kg/m².  Physical Exam   Constitutional: She is oriented to person, place, and time. She appears well-developed and well-nourished.   HENT:   Head: Normocephalic and atraumatic.   Mouth/Throat: Oropharynx is clear and moist.   Eyes: Conjunctivae are normal. Pupils are equal, round, and reactive to light.   Neck: Normal range of motion. Neck supple. No thyromegaly present.   Cardiovascular: Normal rate, regular rhythm, normal heart sounds and intact distal pulses.    No murmur heard.  Pulmonary/Chest: Effort normal and breath sounds normal. No respiratory distress.   Abdominal: Soft. Bowel sounds are normal. She exhibits no mass. There is no tenderness.   Musculoskeletal: Normal range of motion.   Lymphadenopathy:     She has no cervical adenopathy.   Neurological: She is alert and oriented to person, place, and time.   Skin: Skin is warm and dry.   Psychiatric: She has a normal mood and affect. Her behavior is normal. Thought content normal.   Vitals reviewed.  labs pending  EKG SR 60 BPM LAD possible LAFB LVH  Patient Active Problem List   Diagnosis   • Allergic rhinitis   • Calculus of kidney   • Chronic pain   • Esophagitis, reflux   • Essential hypertension, benign   • Fatty liver   • History of colon polyps   • Impaired fasting glucose   • Overweight   • Paroxysmal supraventricular tachycardia (CMS/HCC)   • Pure hypercholesterolemia   • Malignant melanoma (CMS/HCC) (HCC)   • Left cataract         Assessment/Plan:  Left cataract removal IOL implant  Medical clearance  NPO for surgery

## 2018-11-12 NOTE — OR SURGEON
Pre-Procedure patient identification:  I am the primary operating surgeon/proceduralist and I have identified the patient and confirmed laterality is left on 11/12/18 at 9:39 AM Gumaro Ramires MD  Phone Number: [phone]

## 2018-11-12 NOTE — ANESTHESIA PREPROCEDURE EVALUATION
Anesthesia ROS/MED HX      Neuro/Psych    Headaches  Cardiovascular   hypertension   ECG reviewed  GI/Hepatic   liver disease  Endo/Other  Body Habitus: Normal      Past Surgical History:   Procedure Laterality Date   • ABDOMINAL SURGERY  07/2012    Small Bowel Resection   • CATARACT EXTRACTION Right 01/2016   • KNEE CARTILAGE SURGERY Left 11/2008   • SKIN CANCER EXCISION Left 2009    Melanoma   • TUBAL LIGATION         Physical Exam    Airway   Mallampati: II   TM distance: >3 FB   Neck ROM: full  Cardiovascular - normal   Rhythm: regular   Rate: normal  Pulmonary - normal   clear to auscultation  Dental - normal        Anesthesia Plan    Plan: MAC    Technique: MAC     Lines and Monitors: PIV     Airway: natural airway / supplemental oxygen   ASA 3  Blood Products:   Use of Blood Products Discussed: No   Anesthetic plan and risks discussed with: patient  Postop Plan:   Patient Disposition: phase II then home   Pain Management: IV analgesics

## 2018-11-12 NOTE — ANESTHESIA POSTPROCEDURE EVALUATION
Patient: Bev Martinez    Procedure Summary     Date:  11/12/18 Room / Location:   OR 7 / [facility] OR    Anesthesia Start:  0950 Anesthesia Stop:  1022    Procedure:  Cataract Surgery Left eye (Left ) Diagnosis:       Age-related nuclear cataract of left eye      (Cataract)    Surgeon:  Gumaro Ramires MD Responsible Provider:  Ashley Conroy MD    Anesthesia Type:  MAC ASA Status:  3          Anesthesia Type: MAC  PACU Vitals  11/12/2018 1017 - 11/12/2018 1117      11/12/2018 1022 11/12/2018 1038 11/12/2018 1047       BP: (!)  122/55 (!)  104/52 (!)  114/55     Temp: 36.1 °C (97 °F) - -     Pulse: 63 63 66     Resp: 16 16 16     SpO2: 97 % 98 % 98 %             Anesthesia Post Evaluation    Pain management: adequate  Patient location during evaluation: PACU  Patient participation: complete - patient participated  Level of consciousness: awake and alert  Cardiovascular status: acceptable  Airway Patency: adequate  Respiratory status: acceptable  Hydration status: acceptable  Anesthetic complications: no

## 2018-11-12 NOTE — OP NOTE
Surgeon:  Gumaro Ramires MD  Assistants: None  Preop Diagnosis: Cataract left eye  Postop Diagnosis:  Same  Procedure:  Phacoemulsification with posterior chamber lens implant: Left  EBL: none  Complications: None          The patient was brought to the operating room and positively identified.  The left eye was confirmed to be the operative eye.  The local anesthetic was administered.  This consisted of topical tetracaine eyedrops. The left eye was prepped and draped in a sterile manner.  An incision was created at the 6:00 limbus with a 15° angle superblade.  Lidocaine 2% with epinephrine was instilled into becky anterior chamber. This was followed by viscoelastic.  A second incision was created at the temporal limbus with a 2.75 mm angled keratome.The anterior capsulotomy was performed with Utrata forceps to create continuous tear circular capsulorhexis capsulotomy.  Hydrosection was performed.  The lens nucleus was removed with the phacoemulsification unit with a total phaco time of minute 18 seconds using divide and conquer technique. The remaining cortical material was removed with the irrigation aspiration unit. The posterior capsule bag was inflated with viscoelastic. The lens implant, Ole model SN 6 0 WF +23.0 diopters was brought into the operative field, inspected and found to be without defect. This was successfully inserted into the capsular bag.  Implant was positioned so the haptics were oriented horizontally in the bag and the optic was perfectly centered.  The irrigation-aspiration was used to remove the remaining viscoelastic.  The wound was closed by hydration of the corneal stroma with balanced salt solution. The wound was checked for watertightness with weckcell sponges and found to be satisfactory.  The eyelid speculum and drapes were removed.  Tobaradex and Pilocarpine drops were instilled into the left eye. Patient was returned to recovery room in excellent condition.

## 2018-11-12 NOTE — OR SURGEON
Pre-Procedure patient identification:  I am the primary operating surgeon/proceduralist and I have identified the patient and confirmed laterality is left on 11/12/18 at 8:46 AM Gumaro Ramires MD  Phone Number: [phone]

## 2018-11-12 NOTE — PERIOPERATIVE NURSING NOTE
Patient arrived from OR via stretcher for fast-tracking. Oriented but sleepy. VSS. Left eye with shield intact with paper tape as requested. Denies pain.

## 2019-01-07 ENCOUNTER — APPOINTMENT (OUTPATIENT)
Dept: URBAN - METROPOLITAN AREA CLINIC 200 | Age: 74
Setting detail: DERMATOLOGY
End: 2019-01-08

## 2019-01-07 DIAGNOSIS — Z85.828 PERSONAL HISTORY OF OTHER MALIGNANT NEOPLASM OF SKIN: ICD-10-CM

## 2019-01-07 DIAGNOSIS — L57.0 ACTINIC KERATOSIS: ICD-10-CM

## 2019-01-07 PROBLEM — Z85.820 PERSONAL HISTORY OF MALIGNANT MELANOMA OF SKIN: Status: ACTIVE | Noted: 2019-01-07

## 2019-01-07 PROCEDURE — OTHER MIPS QUALITY: OTHER

## 2019-01-07 PROCEDURE — 17000 DESTRUCT PREMALG LESION: CPT

## 2019-01-07 PROCEDURE — OTHER LIQUID NITROGEN: OTHER

## 2019-01-07 PROCEDURE — 99213 OFFICE O/P EST LOW 20 MIN: CPT | Mod: 25

## 2019-01-07 PROCEDURE — OTHER COUNSELING: OTHER

## 2019-01-07 ASSESSMENT — LOCATION DETAILED DESCRIPTION DERM
LOCATION DETAILED: LEFT INFERIOR ANTERIOR NECK
LOCATION DETAILED: NASAL DORSUM

## 2019-01-07 ASSESSMENT — LOCATION SIMPLE DESCRIPTION DERM
LOCATION SIMPLE: LEFT ANTERIOR NECK
LOCATION SIMPLE: NOSE

## 2019-01-07 ASSESSMENT — LOCATION ZONE DERM
LOCATION ZONE: NECK
LOCATION ZONE: NOSE

## 2019-01-07 NOTE — HPI: SKIN LESION
What Type Of Note Output Would You Prefer (Optional)?: Standard Output
How Severe Is Your Skin Lesion?: mild
Is This A New Presentation, Or A Follow-Up?: Growths

## 2019-01-07 NOTE — PROCEDURE: LIQUID NITROGEN
Duration Of Freeze Thaw-Cycle (Seconds): 2
Detail Level: Detailed
Render Post-Care Instructions In Note?: no
Post-Care Instructions: I reviewed with the patient in detail post-care instructions. Patient is to wear sunprotection, and avoid picking at any of the treated lesions. Pt may apply Vaseline to crusted or scabbing areas.
Consent: The patient's consent was obtained including but not limited to risks of crusting, scabbing, blistering, scarring, darker or lighter pigmentary change, recurrence, incomplete removal and infection.

## 2019-01-14 NOTE — TELEPHONE ENCOUNTER
Pt has new RX coverage.  Her scripts now need to be sent to [facility] pharmacy.  She needs [employer] day supplies sent (GENERICS are best)  Zantac 300mg BID  Clarinex 5mg QD  toprol XL 50mg QD  lipitor 10 mg QD    Please send all generics  If any questions or problems, please contact the patient at # [phone]

## 2019-01-14 NOTE — TELEPHONE ENCOUNTER
Can you get address from pt for her [facility] pharmacy-one is located in [place] and the other in [place]-thank you

## 2019-01-15 RX ORDER — METOPROLOL SUCCINATE 50 MG/1
50 TABLET, EXTENDED RELEASE ORAL DAILY
Qty: 90 TABLET | Refills: 1 | Status: SHIPPED | OUTPATIENT
Start: 2019-01-15 | End: 2019-05-21 | Stop reason: SDUPTHER

## 2019-01-15 RX ORDER — ATORVASTATIN CALCIUM 10 MG/1
10 TABLET, FILM COATED ORAL NIGHTLY
Qty: 90 TABLET | Refills: 1 | Status: SHIPPED | OUTPATIENT
Start: 2019-01-15 | End: 2019-05-21 | Stop reason: SDUPTHER

## 2019-01-15 RX ORDER — DESLORATADINE 5 MG/1
5 TABLET ORAL DAILY
Qty: 90 TABLET | Refills: 1 | Status: SHIPPED | OUTPATIENT
Start: 2019-01-15 | End: 2019-06-11 | Stop reason: SDUPTHER

## 2019-01-28 ENCOUNTER — TELEPHONE (OUTPATIENT)
Dept: PRIMARY CARE | Facility: CLINIC | Age: 74
End: 2019-01-28

## 2019-01-28 NOTE — TELEPHONE ENCOUNTER
S/W Karley at [employer]-OK'd for generic (we were both unsure how this went through as brand necessary as it wasn't sent that way??)

## 2019-02-26 ENCOUNTER — HOSPITAL ENCOUNTER (OUTPATIENT)
Facility: HOSPITAL | Age: 74
Setting detail: HOSPITAL OUTPATIENT SURGERY
Discharge: HOME | End: 2019-02-26
Attending: OPHTHALMOLOGY | Admitting: OPHTHALMOLOGY
Payer: MEDICARE

## 2019-02-26 VITALS — HEART RATE: 62 BPM | DIASTOLIC BLOOD PRESSURE: 59 MMHG | SYSTOLIC BLOOD PRESSURE: 138 MMHG

## 2019-02-26 PROCEDURE — 25000000 HC PHARMACY GENERAL: Performed by: OPHTHALMOLOGY

## 2019-02-26 PROCEDURE — 085K3ZZ DESTRUCTION OF LEFT LENS, PERCUTANEOUS APPROACH: ICD-10-PCS | Performed by: OPHTHALMOLOGY

## 2019-02-26 PROCEDURE — 63700000 HC SELF-ADMINISTRABLE DRUG: Performed by: OPHTHALMOLOGY

## 2019-02-26 PROCEDURE — 36000003 HC OR LEVEL 3 INITIAL 30MIN: Performed by: OPHTHALMOLOGY

## 2019-02-26 RX ORDER — TETRACAINE HYDROCHLORIDE 5 MG/ML
SOLUTION OPHTHALMIC AS NEEDED
Status: DISCONTINUED | OUTPATIENT
Start: 2019-02-26 | End: 2019-02-26 | Stop reason: HOSPADM

## 2019-03-11 ENCOUNTER — APPOINTMENT (OUTPATIENT)
Dept: URBAN - METROPOLITAN AREA CLINIC 200 | Age: 74
Setting detail: DERMATOLOGY
End: 2019-03-14

## 2019-03-11 DIAGNOSIS — L57.0 ACTINIC KERATOSIS: ICD-10-CM

## 2019-03-11 DIAGNOSIS — Z85.828 PERSONAL HISTORY OF OTHER MALIGNANT NEOPLASM OF SKIN: ICD-10-CM

## 2019-03-11 DIAGNOSIS — Z85.820 PERSONAL HISTORY OF MALIGNANT MELANOMA OF SKIN: ICD-10-CM

## 2019-03-11 DIAGNOSIS — L57.8 OTHER SKIN CHANGES DUE TO CHRONIC EXPOSURE TO NONIONIZING RADIATION: ICD-10-CM

## 2019-03-11 PROCEDURE — OTHER LIQUID NITROGEN: OTHER

## 2019-03-11 PROCEDURE — 99213 OFFICE O/P EST LOW 20 MIN: CPT | Mod: 25

## 2019-03-11 PROCEDURE — 17003 DESTRUCT PREMALG LES 2-14: CPT

## 2019-03-11 PROCEDURE — OTHER MIPS QUALITY: OTHER

## 2019-03-11 PROCEDURE — 17000 DESTRUCT PREMALG LESION: CPT

## 2019-03-11 PROCEDURE — OTHER COUNSELING: OTHER

## 2019-03-11 ASSESSMENT — LOCATION ZONE DERM
LOCATION ZONE: NECK
LOCATION ZONE: TRUNK
LOCATION ZONE: ARM
LOCATION ZONE: LEG
LOCATION ZONE: FACE

## 2019-03-11 ASSESSMENT — LOCATION DETAILED DESCRIPTION DERM
LOCATION DETAILED: LEFT ELBOW
LOCATION DETAILED: LEFT ANTERIOR SHOULDER
LOCATION DETAILED: LEFT INFERIOR ANTERIOR NECK
LOCATION DETAILED: LEFT POSTERIOR SHOULDER
LOCATION DETAILED: RIGHT DORSAL WRIST
LOCATION DETAILED: LEFT PROXIMAL PRETIBIAL REGION
LOCATION DETAILED: LEFT VENTRAL PROXIMAL FOREARM
LOCATION DETAILED: RIGHT ANTERIOR SHOULDER
LOCATION DETAILED: LEFT MEDIAL SUPERIOR CHEST
LOCATION DETAILED: RIGHT INFERIOR CENTRAL MALAR CHEEK

## 2019-03-11 ASSESSMENT — LOCATION SIMPLE DESCRIPTION DERM
LOCATION SIMPLE: RIGHT WRIST
LOCATION SIMPLE: CHEST
LOCATION SIMPLE: RIGHT CHEEK
LOCATION SIMPLE: LEFT ELBOW
LOCATION SIMPLE: LEFT ANTERIOR NECK
LOCATION SIMPLE: LEFT FOREARM
LOCATION SIMPLE: LEFT SHOULDER
LOCATION SIMPLE: LEFT PRETIBIAL REGION
LOCATION SIMPLE: RIGHT SHOULDER

## 2019-03-11 ASSESSMENT — PAIN INTENSITY VAS: HOW INTENSE IS YOUR PAIN 0 BEING NO PAIN, 10 BEING THE MOST SEVERE PAIN POSSIBLE?: NO PAIN

## 2019-04-09 ENCOUNTER — APPOINTMENT (OUTPATIENT)
Dept: URBAN - METROPOLITAN AREA CLINIC 200 | Age: 74
Setting detail: DERMATOLOGY
End: 2019-04-18

## 2019-04-09 DIAGNOSIS — D485 NEOPLASM OF UNCERTAIN BEHAVIOR OF SKIN: ICD-10-CM

## 2019-04-09 PROBLEM — D48.5 NEOPLASM OF UNCERTAIN BEHAVIOR OF SKIN: Status: ACTIVE | Noted: 2019-04-09

## 2019-04-09 PROCEDURE — 11102 TANGNTL BX SKIN SINGLE LES: CPT

## 2019-04-09 PROCEDURE — OTHER BIOPSY BY SHAVE METHOD: OTHER

## 2019-04-09 ASSESSMENT — LOCATION SIMPLE DESCRIPTION DERM: LOCATION SIMPLE: RIGHT CHEEK

## 2019-04-09 ASSESSMENT — LOCATION DETAILED DESCRIPTION DERM: LOCATION DETAILED: RIGHT SUPERIOR LATERAL MALAR CHEEK

## 2019-04-09 ASSESSMENT — LOCATION ZONE DERM: LOCATION ZONE: FACE

## 2019-04-09 NOTE — PROCEDURE: BIOPSY BY SHAVE METHOD
Size Of Lesion In Cm: 0.2
Dressing: bandage
Additional Anesthesia Volume In Cc (Will Not Render If 0): 0
Bill For Surgical Tray: no
Silver Nitrate Text: The wound bed was treated with silver nitrate after the biopsy was performed.
Biopsy Type: H and E
Curettage Text: The wound bed was treated with curettage after the biopsy was performed.
Notification Instructions: Patient will be notified of biopsy results. However, patient instructed to call the office if not contacted within 2 weeks.
Consent: Written consent was obtained and risks were reviewed including but not limited to scarring, infection, bleeding, scabbing, incomplete removal, nerve damage and allergy to anesthesia.
Detail Level: Detailed
Post-Care Instructions: I reviewed with the patient in detail post-care instructions. Patient is to keep the biopsy site dry overnight, and then apply bacitracin twice daily until healed. Patient may apply hydrogen peroxide soaks to remove any crusting.
Electrodesiccation And Curettage Text: The wound bed was treated with electrodesiccation and curettage after the biopsy was performed.
Billing Type: Third-Party Bill
Was A Bandage Applied: Yes
Hemostasis: Drysol
Biopsy Method: Dermablade
Anesthesia Volume In Cc (Will Not Render If 0): 0.5
Wound Care: Aquaphor
Depth Of Biopsy: dermis
Cryotherapy Text: The wound bed was treated with cryotherapy after the biopsy was performed.
Type Of Destruction Used: Curettage
Electrodesiccation Text: The wound bed was treated with electrodesiccation after the biopsy was performed.

## 2019-05-21 ENCOUNTER — OFFICE VISIT (OUTPATIENT)
Dept: PRIMARY CARE | Facility: CLINIC | Age: 74
End: 2019-05-21
Payer: MEDICARE

## 2019-05-21 VITALS
BODY MASS INDEX: 27.8 KG/M2 | OXYGEN SATURATION: 97 % | WEIGHT: 173 LBS | HEIGHT: 66 IN | HEART RATE: 72 BPM | DIASTOLIC BLOOD PRESSURE: 74 MMHG | SYSTOLIC BLOOD PRESSURE: 118 MMHG

## 2019-05-21 DIAGNOSIS — R82.90 ABNORMAL FINDING ON URINALYSIS: ICD-10-CM

## 2019-05-21 DIAGNOSIS — I47.10 PAROXYSMAL SUPRAVENTRICULAR TACHYCARDIA (CMS/HCC): ICD-10-CM

## 2019-05-21 DIAGNOSIS — K21.00 ESOPHAGITIS, REFLUX: ICD-10-CM

## 2019-05-21 DIAGNOSIS — R73.01 IMPAIRED FASTING GLUCOSE: ICD-10-CM

## 2019-05-21 DIAGNOSIS — C43.9 MALIGNANT MELANOMA, UNSPECIFIED SITE (CMS/HCC): ICD-10-CM

## 2019-05-21 DIAGNOSIS — K76.0 FATTY LIVER: ICD-10-CM

## 2019-05-21 DIAGNOSIS — E78.00 PURE HYPERCHOLESTEROLEMIA: Primary | ICD-10-CM

## 2019-05-21 PROCEDURE — 99214 OFFICE O/P EST MOD 30 MIN: CPT | Performed by: INTERNAL MEDICINE

## 2019-05-21 RX ORDER — ATORVASTATIN CALCIUM 10 MG/1
10 TABLET, FILM COATED ORAL NIGHTLY
Qty: 90 TABLET | Refills: 1 | Status: SHIPPED | OUTPATIENT
Start: 2019-05-21 | End: 2019-11-19 | Stop reason: SDUPTHER

## 2019-05-21 RX ORDER — METOPROLOL SUCCINATE 50 MG/1
50 TABLET, EXTENDED RELEASE ORAL DAILY
Qty: 90 TABLET | Refills: 1 | Status: SHIPPED | OUTPATIENT
Start: 2019-05-21 | End: 2019-11-19 | Stop reason: SDUPTHER

## 2019-05-21 NOTE — PROGRESS NOTES
[facility]  Dr. Veronica Mars  [address], [address]  [place] [address]  Phone: [phone]  Fax: [phone]      Patient ID: Bev Martinez                              : 1945    Visit Date: 19    Chief Complaint: Results (discuss labs-+ urine culture, had been treated with macrobid)      Patient ID: Bev Martinez is a 74 y.o. female.    HPI  Here to follow up regarding lab work primarily.  [facility]  In  pool 3 times per week.      Had lab work done a week ago- discussed with patient.  but A1c only 5.9. , HDL 4.2 and trigs 189. CBC normal and ua WBC 21-50 and uc +E.coli    Had a UTI since seen here last and was treated with macrodantin by her gynecologist.  Currently denies urinary frequency, urgency, hematuria, dysuria, flank pain, suprapubic pain, nausea, incontinence.  Surprised that the urine culture is positive.    Hyperlipidemia   This is a chronic problem. The current episode started more than 1 month ago. The problem is controlled (except trig). She has no history of chronic renal disease, diabetes, hypothyroidism, liver disease, obesity or nephrotic syndrome. Factors aggravating her hyperlipidemia include beta blockers. Pertinent negatives include no chest pain, focal weakness, leg pain or shortness of breath. Current antihyperlipidemic treatment includes statins. The current treatment provides significant improvement of lipids. Compliance problems include adherence to diet.  Risk factors for coronary artery disease include post-menopausal.   Reflux / GERD   She complains of dysphagia (but just if not taking medication). She reports no chest pain, no early satiety, no globus sensation or no hoarse voice. This is a chronic problem. The current episode started more than 1 year ago. The problem occurs occasionally. She has tried a histamine-2 antagonist and a diet change for the symptoms. The  "treatment provided significant relief.         Patient Active Problem List   Diagnosis   • Allergic rhinitis   • Calculus of kidney   • Esophagitis, reflux   • Fatty liver   • History of colon polyps   • Impaired fasting glucose   • Overweight   • Paroxysmal supraventricular tachycardia (CMS/HCC)   • Pure hypercholesterolemia   • Malignant melanoma (CMS/HCC) (HCC)   • Left cataract   • Abnormal finding on urinalysis         Current Outpatient Prescriptions:   •  aspirin 81 mg enteric coated tablet, Take 81 mg by mouth 2 (two) times a week (Mon, Thu). Monday and Thursday, Disp: , Rfl:   •  atorvastatin (LIPITOR) 10 mg tablet, Take 1 tablet (10 mg total) by mouth nightly., Disp: 90 tablet, Rfl: 1  •  desloratadine (CLARINEX) 5 mg tablet, Take 1 tablet (5 mg total) by mouth daily., Disp: 90 tablet, Rfl: 1  •  latanoprost (XALATAN) 0.005 % ophthalmic solution, Administer 1 drop into both eyes daily.  , Disp: , Rfl:   •  metoprolol succinate XL (TOPROL XL) 50 mg 24 hr tablet, Take 1 tablet (50 mg total) by mouth daily., Disp: 90 tablet, Rfl: 1  •  ranitidine (ZANTAC) 300 mg tablet, Take 1 tablet (300 mg total) by mouth 2 (two) times a day., Disp: 180 tablet, Rfl: 1  •  timolol (TIMOPTIC-XR) 0.5 % ophthalmic gel-forming, Administer 1 drop into both eyes daily.  , Disp: , Rfl:   •  valACYclovir (VALTREX) 500 mg tablet, Take 500 mg by mouth as needed.  , Disp: , Rfl:     Allergies   Allergen Reactions   • Adhesive Tape-Silicones Other (see comments)     \"Skin Burn\"   • Ambien [Zolpidem] Other (see comments)     Amnesia   • Dexilant [Dexlansoprazole] GI intolerance   • Sulfa (Sulfonamide Antibiotics) Rash       Social History     Social History   • Marital status:      Spouse name: N/A   • Number of children: N/A   • Years of education: N/A     Occupational History   • retired - GYN CRNP      Social History Main Topics   • Smoking status: Never Smoker   • Smokeless tobacco: Never Used   • Alcohol use No   • Drug use: " No   • Sexual activity: Not on file     Other Topics Concern   • Not on file     Social History Narrative   • No narrative on file       Health Maintenance   Topic Date Due   • DTaP, Tdap, and Td Vaccines (1 - Tdap) 01/31/1964   • Zoster Vaccine (1 of 2) 01/31/1995   • Medicare Annual Wellness Visit  01/31/2010   • DEXA Scan  01/31/2010   • Annual Falls Risk Screening  01/31/2010   • Pneumococcal (65 years and older) (2 of 2 - PPSV23) 07/11/2017   • Mammogram  05/06/2021   • Colonoscopy  11/11/2026   • Meningococcal ACWY  Aged Out   • Varicella Vaccines  Aged Out   • HIB Vaccines  Aged Out   • IPV Vaccines  Aged Out   • Influenza Vaccine  Completed   • HPV Vaccines  Aged Out   • Hepatitis C Screening  Completed   • Pneumococcal  Aged Out       Past Medical History:   Diagnosis Date   • Allergic rhinitis    • Calculus of kidney     Spontaneously passed   • Cataracts, bilateral    • Chronic pain     Back Pain   • Esophagitis, reflux    • Essential hypertension, benign    • Fatty liver    • Glaucoma    • H/O cardiovascular stress test    • History of colon polyps    • Impaired fasting glucose     Blood Glucose usually 115-124 in am   • Macular degeneration    • Malignant melanoma (CMS/HCC) (HCC) 2010    Left Arm   • Migraines     Last migraine 30 years ago   • Overweight    • Paroxysmal supraventricular tachycardia (CMS/HCC)     Controlled with Toprol   • Prediabetes    • Pure hypercholesterolemia    • Skin cancer     Squamous cell Face   • Small bowel obstruction (CMS/HCC) (HCC)     Caused by Scar tissue from Tubal Ligation   • UTI (urinary tract infection) 04/2018    Develped an SVT with UTI and followed by cardiology       The following have been reviewed and updated as appropriate in this visit:  Allergies  Meds  Problems         Review of System  Review of Systems   HENT: Negative for hoarse voice.    Respiratory: Negative for shortness of breath.    Cardiovascular: Negative for chest pain.   Gastrointestinal:  "Positive for dysphagia (but just if not taking medication).   Genitourinary: Positive for difficulty urinating and dysuria.   Neurological: Negative for focal weakness.       Objective     Vitals  Vitals:    05/21/19 1026   BP: 118/74   Pulse: 72   SpO2: 97%   Weight: 78.5 kg (173 lb)   Height: 1.664 m (5' 5.5\")       Body mass index is 28.35 kg/m².    Physical Exam  Physical Exam   Constitutional: She is oriented to person, place, and time.   Neck: Neck supple. Normal carotid pulses present. Carotid bruit is not present. No thyromegaly present.   Cardiovascular: Normal rate, regular rhythm and normal heart sounds.  Exam reveals no gallop.    No murmur heard.  Pulmonary/Chest: Effort normal and breath sounds normal.   Abdominal: Soft. Bowel sounds are normal. She exhibits no mass. There is no tenderness. There is no CVA tenderness.   Musculoskeletal: She exhibits no edema.   Neurological: She is oriented to person, place, and time. Gait normal.   Skin: Skin is warm, dry and intact. No lesion (in exposed area) noted.   Psychiatric: She has a normal mood and affect.   Vitals reviewed.      Assessment/Plan     Problem List Items Addressed This Visit        Circulatory    Paroxysmal supraventricular tachycardia (CMS/HCC)     No related symptoms  Continue metoprolol daily         Relevant Medications    atorvastatin (LIPITOR) 10 mg tablet    metoprolol succinate XL (TOPROL XL) 50 mg 24 hr tablet       Digestive    Esophagitis, reflux     Controlled   Continue ranitidine twice daily  Decrease weight   Avoid foods not well tolerated and large/late meals         Relevant Medications    ranitidine (ZANTAC) 300 mg tablet    Other Relevant Orders    CBC and differential    Fatty liver       Endocrine/Metabolic    Impaired fasting glucose     Stable   Encouraged low carb and lower calorie diet  Decrease weight  Continue with aim to avoid onset of DM   Follow A1c         Relevant Orders    Hemoglobin A1c    Urinalysis with " Reflex Culture    Pure hypercholesterolemia - Primary     At goal except trigs  See IFG plan for diet  Continue atorvastatin at current dose  Check lipid panel and CMP with next labs           Relevant Medications    atorvastatin (LIPITOR) 10 mg tablet    Other Relevant Orders    Comprehensive metabolic panel    Lipid panel       Other    Malignant melanoma (CMS/HCC) (HCC)     Denies any new concerning lesions  Sees dermatologist several times yearly as requested by this specialist           Abnormal finding on urinalysis     Ua without notable amount of bacteria and patient without any UTI symptoms  Advised to drink plenty of fluids and empty bladder every 3 - 4 hours.  Add cranberry to diet - juice or tabs  Report any symptoms that develop in next week or 2  No treatment without symptoms or bacteruria  Check ua with next labs               Return in about 6 months (around 11/21/2019).      Veronica Mars MD  5/26/2019

## 2019-05-26 PROBLEM — R82.90 ABNORMAL FINDING ON URINALYSIS: Status: ACTIVE | Noted: 2019-05-26

## 2019-05-26 ASSESSMENT — ENCOUNTER SYMPTOMS
FOCAL WEAKNESS: 0
GLOBUS SENSATION: 0
LEG PAIN: 0
DIFFICULTY URINATING: 1
SHORTNESS OF BREATH: 0
HOARSE VOICE: 0
DYSURIA: 1
EARLY SATIETY: 0

## 2019-05-27 NOTE — ASSESSMENT & PLAN NOTE
At goal except trigs  See IFG plan for diet  Continue atorvastatin at current dose  Check lipid panel and CMP with next labs

## 2019-05-27 NOTE — ASSESSMENT & PLAN NOTE
Denies any new concerning lesions  Sees dermatologist several times yearly as requested by this specialist

## 2019-05-27 NOTE — ASSESSMENT & PLAN NOTE
Ua without notable amount of bacteria and patient without any UTI symptoms  Advised to drink plenty of fluids and empty bladder every 3 - 4 hours.  Add cranberry to diet - juice or tabs  Report any symptoms that develop in next week or 2  No treatment without symptoms or bacteruria  Check ua with next labs

## 2019-05-27 NOTE — ASSESSMENT & PLAN NOTE
Controlled   Continue ranitidine twice daily  Decrease weight   Avoid foods not well tolerated and large/late meals

## 2019-06-11 RX ORDER — DESLORATADINE 5 MG/1
5 TABLET ORAL DAILY
Qty: 90 TABLET | Refills: 1 | Status: SHIPPED | OUTPATIENT
Start: 2019-06-11 | End: 2019-11-19 | Stop reason: SDUPTHER

## 2019-06-11 NOTE — TELEPHONE ENCOUNTER
Medicine Refill Request    Last Office Visit: 5/21/2019  Next Office Visit: Visit date not found        Current Outpatient Prescriptions:   •  aspirin 81 mg enteric coated tablet, Take 81 mg by mouth 2 (two) times a week (Mon, Thu). Monday and Thursday, Disp: , Rfl:   •  atorvastatin (LIPITOR) 10 mg tablet, Take 1 tablet (10 mg total) by mouth nightly., Disp: 90 tablet, Rfl: 1  •  desloratadine (CLARINEX) 5 mg tablet, Take 1 tablet (5 mg total) by mouth daily., Disp: 90 tablet, Rfl: 1  •  latanoprost (XALATAN) 0.005 % ophthalmic solution, Administer 1 drop into both eyes daily.  , Disp: , Rfl:   •  metoprolol succinate XL (TOPROL XL) 50 mg 24 hr tablet, Take 1 tablet (50 mg total) by mouth daily., Disp: 90 tablet, Rfl: 1  •  ranitidine (ZANTAC) 300 mg tablet, Take 1 tablet (300 mg total) by mouth 2 (two) times a day., Disp: 180 tablet, Rfl: 1  •  timolol (TIMOPTIC-XR) 0.5 % ophthalmic gel-forming, Administer 1 drop into both eyes daily.  , Disp: , Rfl:   •  valACYclovir (VALTREX) 500 mg tablet, Take 500 mg by mouth as needed.  , Disp: , Rfl:       BP Readings from Last 3 Encounters:   05/21/19 118/74   02/26/19 (!) 138/59   11/12/18 (!) 114/55       Recent Lab results:  No results found for: CHOL, No results found for: HDL, No results found for: LDLCALC, No results found for: TRIG     Lab Results   Component Value Date    GLUCOSE 122 (H) 11/05/2018   , No results found for: HGBA1C      Lab Results   Component Value Date    CREATININE 0.9 11/05/2018       No results found for: TSH

## 2019-09-16 ENCOUNTER — APPOINTMENT (OUTPATIENT)
Dept: URBAN - METROPOLITAN AREA CLINIC 200 | Age: 74
Setting detail: DERMATOLOGY
End: 2019-09-17

## 2019-09-16 DIAGNOSIS — Z85.828 PERSONAL HISTORY OF OTHER MALIGNANT NEOPLASM OF SKIN: ICD-10-CM

## 2019-09-16 DIAGNOSIS — L57.8 OTHER SKIN CHANGES DUE TO CHRONIC EXPOSURE TO NONIONIZING RADIATION: ICD-10-CM

## 2019-09-16 DIAGNOSIS — Z85.820 PERSONAL HISTORY OF MALIGNANT MELANOMA OF SKIN: ICD-10-CM

## 2019-09-16 PROBLEM — J30.1 ALLERGIC RHINITIS DUE TO POLLEN: Status: ACTIVE | Noted: 2019-09-16

## 2019-09-16 PROBLEM — E78.5 HYPERLIPIDEMIA, UNSPECIFIED: Status: ACTIVE | Noted: 2019-09-16

## 2019-09-16 PROCEDURE — 99213 OFFICE O/P EST LOW 20 MIN: CPT

## 2019-09-16 PROCEDURE — OTHER COUNSELING: OTHER

## 2019-09-16 PROCEDURE — OTHER MIPS QUALITY: OTHER

## 2019-09-16 ASSESSMENT — LOCATION SIMPLE DESCRIPTION DERM: LOCATION SIMPLE: CHEST

## 2019-09-16 ASSESSMENT — LOCATION ZONE DERM: LOCATION ZONE: TRUNK

## 2019-09-16 ASSESSMENT — LOCATION DETAILED DESCRIPTION DERM: LOCATION DETAILED: LEFT MEDIAL SUPERIOR CHEST

## 2019-10-03 ENCOUNTER — TELEPHONE (OUTPATIENT)
Dept: PRIMARY CARE | Facility: CLINIC | Age: 74
End: 2019-10-03

## 2019-10-03 NOTE — TELEPHONE ENCOUNTER
S/W pt-she usually takes Zantac 300mg BID and takes OTC Pepcid in between if has pain (per DR Godinez).  She will need the equivalent to that dosage.  Can only takes meds that are similar to Zantac because other meds make her nauseated.  Should she contact GI for advisement?

## 2019-10-04 NOTE — TELEPHONE ENCOUNTER
Detailed message left on pt voicemail with instructions, told to call GI for advisement but that we would send Pepcid into her pharm for her to take until she is able to touch base with them.  Rx in your pending for approval

## 2019-10-07 RX ORDER — FAMOTIDINE 40 MG/1
40 TABLET, FILM COATED ORAL 2 TIMES DAILY
Qty: 60 TABLET | Refills: 0 | Status: SHIPPED | OUTPATIENT
Start: 2019-10-07 | End: 2019-10-22 | Stop reason: SDUPTHER

## 2019-10-07 NOTE — TELEPHONE ENCOUNTER
She is asking if Pepsid 40 mg  BID  #30  To [facility] [place] [facility]   ALSO   Pepsid 40 mg  BID  #90  To her mailorder   [facility]

## 2019-10-21 ENCOUNTER — TELEPHONE (OUTPATIENT)
Dept: PRIMARY CARE | Facility: CLINIC | Age: 74
End: 2019-10-21

## 2019-10-21 DIAGNOSIS — K21.00 ESOPHAGITIS, REFLUX: ICD-10-CM

## 2019-10-21 NOTE — TELEPHONE ENCOUNTER
Medicine Refill Request    Last Office Visit: 5/21/2019  Next Office Visit: 11/19/2019        Current Outpatient Medications:   •  aspirin 81 mg enteric coated tablet, Take 81 mg by mouth 2 (two) times a week (Mon, Thu). Monday and Thursday, Disp: , Rfl:   •  atorvastatin (LIPITOR) 10 mg tablet, Take 1 tablet (10 mg total) by mouth nightly., Disp: 90 tablet, Rfl: 1  •  desloratadine (CLARINEX) 5 mg tablet, Take 1 tablet (5 mg total) by mouth daily., Disp: 90 tablet, Rfl: 1  •  famotidine (PEPCID) 40 mg tablet, Take 1 tablet (40 mg total) by mouth 2 (two) times a day., Disp: 60 tablet, Rfl: 0  •  famotidine (PEPCID) 40 mg tablet, take 1 tablet by mouth twice a day, Disp: 180 tablet, Rfl: 0  •  latanoprost (XALATAN) 0.005 % ophthalmic solution, Administer 1 drop into both eyes daily.  , Disp: , Rfl:   •  metoprolol succinate XL (TOPROL XL) 50 mg 24 hr tablet, Take 1 tablet (50 mg total) by mouth daily., Disp: 90 tablet, Rfl: 1  •  timolol (TIMOPTIC-XR) 0.5 % ophthalmic gel-forming, Administer 1 drop into both eyes daily.  , Disp: , Rfl:   •  valACYclovir (VALTREX) 500 mg tablet, Take 500 mg by mouth as needed.  , Disp: , Rfl:       BP Readings from Last 3 Encounters:   05/21/19 118/74   02/26/19 (!) 138/59   11/12/18 (!) 114/55       Recent Lab results:  No results found for: CHOL, No results found for: HDL, No results found for: LDLCALC, No results found for: TRIG     Lab Results   Component Value Date    GLUCOSE 122 (H) 11/05/2018   , No results found for: HGBA1C      Lab Results   Component Value Date    CREATININE 0.9 11/05/2018       No results found for: TSH      Medicine Refill Request    Last Office Visit: 5/21/2019  Next Office Visit: 11/19/2019  Please send in Pepsid 40 mg   #[id number]      To [facility]   Mail Away      Current Outpatient Medications:   •  aspirin 81 mg enteric coated tablet, Take 81 mg by mouth 2 (two) times a week (Mon, Thu). Monday and Thursday, Disp: , Rfl:   •  atorvastatin (LIPITOR) 10 mg  tablet, Take 1 tablet (10 mg total) by mouth nightly., Disp: 90 tablet, Rfl: 1  •  desloratadine (CLARINEX) 5 mg tablet, Take 1 tablet (5 mg total) by mouth daily., Disp: 90 tablet, Rfl: 1  •  famotidine (PEPCID) 40 mg tablet, Take 1 tablet (40 mg total) by mouth 2 (two) times a day., Disp: 60 tablet, Rfl: 0  •  famotidine (PEPCID) 40 mg tablet, take 1 tablet by mouth twice a day, Disp: 180 tablet, Rfl: 0  •  latanoprost (XALATAN) 0.005 % ophthalmic solution, Administer 1 drop into both eyes daily.  , Disp: , Rfl:   •  metoprolol succinate XL (TOPROL XL) 50 mg 24 hr tablet, Take 1 tablet (50 mg total) by mouth daily., Disp: 90 tablet, Rfl: 1  •  timolol (TIMOPTIC-XR) 0.5 % ophthalmic gel-forming, Administer 1 drop into both eyes daily.  , Disp: , Rfl:   •  valACYclovir (VALTREX) 500 mg tablet, Take 500 mg by mouth as needed.  , Disp: , Rfl:       BP Readings from Last 3 Encounters:   05/21/19 118/74   02/26/19 (!) 138/59   11/12/18 (!) 114/55       Recent Lab results:  No results found for: CHOL, No results found for: HDL, No results found for: LDLCALC, No results found for: TRIG     Lab Results   Component Value Date    GLUCOSE 122 (H) 11/05/2018   , No results found for: HGBA1C      Lab Results   Component Value Date    CREATININE 0.9 11/05/2018       No results found for: TSH

## 2019-10-21 NOTE — TELEPHONE ENCOUNTER
Medicine Refill Request    Last Office Visit: 5/21/2019  Next Office Visit: 11/19/2019        Current Outpatient Medications:   •  aspirin 81 mg enteric coated tablet, Take 81 mg by mouth 2 (two) times a week (Mon, Thu). Monday and Thursday, Disp: , Rfl:   •  atorvastatin (LIPITOR) 10 mg tablet, Take 1 tablet (10 mg total) by mouth nightly., Disp: 90 tablet, Rfl: 1  •  desloratadine (CLARINEX) 5 mg tablet, Take 1 tablet (5 mg total) by mouth daily., Disp: 90 tablet, Rfl: 1  •  famotidine (PEPCID) 40 mg tablet, Take 1 tablet (40 mg total) by mouth 2 (two) times a day., Disp: 60 tablet, Rfl: 0  •  famotidine (PEPCID) 40 mg tablet, take 1 tablet by mouth twice a day, Disp: 180 tablet, Rfl: 0  •  latanoprost (XALATAN) 0.005 % ophthalmic solution, Administer 1 drop into both eyes daily.  , Disp: , Rfl:   •  metoprolol succinate XL (TOPROL XL) 50 mg 24 hr tablet, Take 1 tablet (50 mg total) by mouth daily., Disp: 90 tablet, Rfl: 1  •  timolol (TIMOPTIC-XR) 0.5 % ophthalmic gel-forming, Administer 1 drop into both eyes daily.  , Disp: , Rfl:   •  valACYclovir (VALTREX) 500 mg tablet, Take 500 mg by mouth as needed.  , Disp: , Rfl:       BP Readings from Last 3 Encounters:   05/21/19 118/74   02/26/19 (!) 138/59   11/12/18 (!) 114/55       Recent Lab results:  No results found for: CHOL, No results found for: HDL, No results found for: LDLCALC, No results found for: TRIG     Lab Results   Component Value Date    GLUCOSE 122 (H) 11/05/2018   , No results found for: HGBA1C      Lab Results   Component Value Date    CREATININE 0.9 11/05/2018       No results found for: TSH

## 2019-10-22 RX ORDER — FAMOTIDINE 40 MG/1
40 TABLET, FILM COATED ORAL
Qty: 180 TABLET | Refills: 0 | Status: SHIPPED | OUTPATIENT
Start: 2019-10-22 | End: 2019-11-19 | Stop reason: SDUPTHER

## 2019-11-19 ENCOUNTER — OFFICE VISIT (OUTPATIENT)
Dept: PRIMARY CARE | Facility: CLINIC | Age: 74
End: 2019-11-19
Payer: MEDICARE

## 2019-11-19 VITALS
BODY MASS INDEX: 28.28 KG/M2 | SYSTOLIC BLOOD PRESSURE: 130 MMHG | DIASTOLIC BLOOD PRESSURE: 74 MMHG | WEIGHT: 176 LBS | HEART RATE: 84 BPM | HEIGHT: 66 IN | OXYGEN SATURATION: 98 % | RESPIRATION RATE: 18 BRPM

## 2019-11-19 DIAGNOSIS — K21.00 ESOPHAGITIS, REFLUX: Primary | ICD-10-CM

## 2019-11-19 DIAGNOSIS — E78.00 PURE HYPERCHOLESTEROLEMIA: ICD-10-CM

## 2019-11-19 DIAGNOSIS — B00.9 HERPES SIMPLEX: ICD-10-CM

## 2019-11-19 DIAGNOSIS — M25.512 ACUTE PAIN OF BOTH SHOULDERS: ICD-10-CM

## 2019-11-19 DIAGNOSIS — M25.511 ACUTE PAIN OF BOTH SHOULDERS: ICD-10-CM

## 2019-11-19 DIAGNOSIS — I47.10 PAROXYSMAL SUPRAVENTRICULAR TACHYCARDIA (CMS/HCC): ICD-10-CM

## 2019-11-19 PROCEDURE — 99214 OFFICE O/P EST MOD 30 MIN: CPT | Performed by: INTERNAL MEDICINE

## 2019-11-19 RX ORDER — FAMOTIDINE 40 MG/1
40 TABLET, FILM COATED ORAL
Qty: 180 TABLET | Refills: 3 | Status: SHIPPED | OUTPATIENT
Start: 2019-11-19 | End: 2020-07-13 | Stop reason: SDUPTHER

## 2019-11-19 RX ORDER — VALACYCLOVIR HYDROCHLORIDE 500 MG/1
500 TABLET, FILM COATED ORAL 2 TIMES DAILY
Qty: 30 TABLET | Refills: 1 | Status: SHIPPED | OUTPATIENT
Start: 2019-11-19 | End: 2019-12-03 | Stop reason: SDUPTHER

## 2019-11-19 RX ORDER — DESLORATADINE 5 MG/1
5 TABLET ORAL DAILY
Qty: 90 TABLET | Refills: 1 | Status: SHIPPED | OUTPATIENT
Start: 2019-11-19 | End: 2020-06-19 | Stop reason: SDUPTHER

## 2019-11-19 RX ORDER — ATORVASTATIN CALCIUM 10 MG/1
10 TABLET, FILM COATED ORAL NIGHTLY
Qty: 90 TABLET | Refills: 1 | Status: SHIPPED | OUTPATIENT
Start: 2019-11-19 | End: 2020-06-19 | Stop reason: SDUPTHER

## 2019-11-19 RX ORDER — CYCLOBENZAPRINE HCL 10 MG
10 TABLET ORAL 3 TIMES DAILY PRN
Qty: 90 TABLET | Refills: 0 | Status: SHIPPED | OUTPATIENT
Start: 2019-11-19 | End: 2019-12-03 | Stop reason: SDUPTHER

## 2019-11-19 RX ORDER — METOPROLOL SUCCINATE 50 MG/1
50 TABLET, EXTENDED RELEASE ORAL DAILY
Qty: 90 TABLET | Refills: 1 | Status: SHIPPED | OUTPATIENT
Start: 2019-11-19 | End: 2020-06-19 | Stop reason: SDUPTHER

## 2019-11-19 NOTE — PROGRESS NOTES
[facility]  Dr. Veronica Mars  [address], [address]  [place] [address]  Phone: [phone]  Fax: [phone]      Patient ID: Bev Martinez                              : 1945    Visit Date: 19    Chief Complaint: Follow-up (med check)      Patient ID: Bev Martinez is a 74 y.o. female.    HPI  Here as advised she needed to be seen when she called for a refill of her Pepcid.  Reminds me that she cannot take PPI's.    Has had trouble with shoulder pain in the last 2 months.  Saw JAZZY Nunez MD 10/24/19 with this complaint.  Given Flexeril by him and referred for PT.  Started PT 19 and does think it is helping.  Not in the pool since 10/1/19 more than 3 times.  Starting to have back discomfort - swimming is major help to her back.    Lab request given to her in 2019 when last seen  Not done yet - thought only needed them once a year    Denies any problem with rapid heart rate.  Hyperlipidemia   Pertinent negatives include no chest pain or shortness of breath.   Reflux / GERD   She reports no chest pain.         Patient Active Problem List   Diagnosis   • Allergic rhinitis   • Calculus of kidney   • Esophagitis, reflux   • Fatty liver   • History of colon polyps   • Impaired fasting glucose   • Overweight   • Paroxysmal supraventricular tachycardia (CMS/HCC)   • Pure hypercholesterolemia   • Malignant melanoma (CMS/HCC)   • Left cataract   • Abnormal finding on urinalysis   • Herpes simplex   • Acute pain of both shoulders         Current Outpatient Medications:   •  aspirin 81 mg enteric coated tablet, Take 81 mg by mouth 2 (two) times a week (Mon, Thu). Monday and Thursday, Disp: , Rfl:   •  atorvastatin (LIPITOR) 10 mg tablet, Take 1 tablet (10 mg total) by mouth nightly., Disp: 90 tablet, Rfl: 1  •  desloratadine (CLARINEX) 5 mg tablet, Take 1 tablet (5 mg total) by mouth daily., Disp: 90 tablet, Rfl: 1  •  famotidine (PEPCID) 40 mg  "tablet, Take 1 tablet (40 mg total) by mouth 2 (two) times a day., Disp: 180 tablet, Rfl: 3  •  latanoprost (XALATAN) 0.005 % ophthalmic solution, Administer 1 drop into both eyes daily.  , Disp: , Rfl:   •  metoprolol succinate XL (TOPROL XL) 50 mg 24 hr tablet, Take 1 tablet (50 mg total) by mouth daily., Disp: 90 tablet, Rfl: 1  •  timolol (TIMOPTIC-XR) 0.5 % ophthalmic gel-forming, Administer 1 drop into both eyes daily.  , Disp: , Rfl:   •  valACYclovir (VALTREX) 500 mg tablet, Take 1 tablet (500 mg total) by mouth 2 (two) times a day., Disp: 30 tablet, Rfl: 1  •  cyclobenzaprine (FLEXERIL) 10 mg tablet, Take 1 tablet (10 mg total) by mouth 3 (three) times a day as needed for muscle spasms for up to 14 days., Disp: 90 tablet, Rfl: 0    Allergies   Allergen Reactions   • Adhesive Tape-Silicones Other (see comments)     \"Skin Burn\"   • Ambien [Zolpidem] Other (see comments)     Amnesia   • Dexilant [Dexlansoprazole] GI intolerance   • Sulfa (Sulfonamide Antibiotics) Rash       Social History     Tobacco Use   • Smoking status: Never Smoker   • Smokeless tobacco: Never Used   Substance Use Topics   • Alcohol use: No   • Drug use: No       Health Maintenance   Topic Date Due   • DTaP, Tdap, and Td Vaccines (1 - Tdap) 01/31/1964   • DEXA Scan  01/31/2010   • Annual Falls Risk Screening  01/31/2010   • Pneumococcal (65 years and older) (2 of 2 - PPSV23) 01/29/2020 (Originally 7/11/2017)   • Medicare Annual Wellness Visit  02/04/2020 (Originally 1/31/1963)   • Mammogram  05/06/2021   • Colonoscopy  11/11/2026   • Zoster Vaccine  Completed   • Influenza Vaccine  Completed   • Hepatitis C Screening  Completed   • Meningococcal ACWY  Aged Out   • Varicella Vaccines  Aged Out   • HIB Vaccines  Aged Out   • IPV Vaccines  Aged Out   • HPV Vaccines  Aged Out   • Pneumococcal  Aged Out       Past Medical History:   Diagnosis Date   • Allergic rhinitis    • Calculus of kidney     Spontaneously passed   • Cataracts, bilateral  " "  • Chronic pain     Back Pain   • Esophagitis, reflux    • Essential hypertension, benign    • Fatty liver    • Glaucoma    • H/O cardiovascular stress test    • History of colon polyps    • Impaired fasting glucose     Blood Glucose usually 115-124 in am   • Macular degeneration    • Malignant melanoma (CMS/HCC) 2010    Left Arm   • Migraines     Last migraine 30 years ago   • Overweight    • Paroxysmal supraventricular tachycardia (CMS/HCC)     Controlled with Toprol   • Prediabetes    • Pure hypercholesterolemia    • Skin cancer     Squamous cell Face   • Small bowel obstruction (CMS/HCC)     Caused by Scar tissue from Tubal Ligation   • UTI (urinary tract infection) 04/2018    Develped an SVT with UTI and followed by cardiology       The following have been reviewed and updated as appropriate in this visit:  Allergies  Meds  Problems         Review of System  Review of Systems   Constitutional: Negative for appetite change and unexpected weight change.   Respiratory: Negative for shortness of breath.    Cardiovascular: Negative for chest pain, palpitations and leg swelling.   Musculoskeletal: Positive for arthralgias (shoulders) and back pain (chronic ).       Objective     Vitals  Vitals:    11/19/19 1119   BP: 130/74   BP Location: Right upper arm   Patient Position: Sitting   Pulse: 84   Resp: 18   SpO2: 98%   Weight: 79.8 kg (176 lb)   Height: 1.664 m (5' 5.5\")       Body mass index is 28.84 kg/m².    Physical Exam  Physical Exam   Constitutional: She is oriented to person, place, and time. She appears well-nourished.   Neck: Neck supple. No thyromegaly present.   Cardiovascular: Normal rate, regular rhythm and normal heart sounds. Exam reveals no gallop.   No murmur heard.  Pulmonary/Chest: Effort normal and breath sounds normal.   Abdominal: Soft. Bowel sounds are normal. There is no tenderness.   Musculoskeletal: She exhibits no edema.   Decreased ROM bilateral shoulders   Neurological: She is " oriented to person, place, and time. Gait normal.   Skin: Skin is warm and dry.   Psychiatric: She has a normal mood and affect.   Vitals reviewed.      Assessment/Plan     Problem List Items Addressed This Visit        Nervous    Acute pain of both shoulders     Improved some with PT  Still using cyclobenzaprine as needed  Script for this given  Follow up with ortho if not improved to baseline with PT         Relevant Medications    cyclobenzaprine (FLEXERIL) 10 mg tablet       Circulatory    Paroxysmal supraventricular tachycardia (CMS/HCC)     No related symptoms  Continue current medication   Have lab work done prior to follow up         Relevant Medications    atorvastatin (LIPITOR) 10 mg tablet    metoprolol succinate XL (TOPROL XL) 50 mg 24 hr tablet       Digestive    Esophagitis, reflux - Primary     Symptoms controlled with medication and adhering to conservative measure s to reduce reflux  Script for Pepcid given  Check CBC          Relevant Medications    famotidine (PEPCID) 40 mg tablet       Endocrine/Metabolic    Pure hypercholesterolemia     Adhere to low fat diet  Continue current atorvastatin dose  Have lab work done 5/2020         Relevant Medications    atorvastatin (LIPITOR) 10 mg tablet       Infectious/Inflammatory    Herpes simplex     Current management adequate  Continue valacyclovir as needed         Relevant Medications    valACYclovir (VALTREX) 500 mg tablet          Return in about 6 months (around 5/19/2020).      Veronica Mars MD  11/22/2019

## 2019-11-22 PROBLEM — M25.511 ACUTE PAIN OF BOTH SHOULDERS: Status: ACTIVE | Noted: 2019-11-22

## 2019-11-22 PROBLEM — M25.512 ACUTE PAIN OF BOTH SHOULDERS: Status: ACTIVE | Noted: 2019-11-22

## 2019-11-22 PROBLEM — B00.9 HERPES SIMPLEX: Status: ACTIVE | Noted: 2019-11-22

## 2019-11-22 ASSESSMENT — ENCOUNTER SYMPTOMS
PALPITATIONS: 0
SHORTNESS OF BREATH: 0
APPETITE CHANGE: 0
UNEXPECTED WEIGHT CHANGE: 0
ARTHRALGIAS: 1
BACK PAIN: 1

## 2019-11-23 NOTE — ASSESSMENT & PLAN NOTE
Symptoms controlled with medication and adhering to conservative measure s to reduce reflux  Script for Pepcid given  Check CBC

## 2019-11-23 NOTE — ASSESSMENT & PLAN NOTE
Improved some with PT  Still using cyclobenzaprine as needed  Script for this given  Follow up with ortho if not improved to baseline with PT

## 2019-11-26 ENCOUNTER — TELEPHONE (OUTPATIENT)
Dept: PRIMARY CARE | Facility: CLINIC | Age: 74
End: 2019-11-26

## 2019-11-26 NOTE — TELEPHONE ENCOUNTER
[facility] called to ask if you can send Cyclobenzaprine    And Valcyclovir  50 mg  Can be sent to them for a 90 day supply

## 2019-11-26 NOTE — TELEPHONE ENCOUNTER
I don't know. I just saw the patient and gave her cyclobenzaprine - she is currently using it but doesn't always - thought the 90 would last a year . It is not a chronic me for her. Ortho started it. Also she said #30 valacyclovir was enough. Please check with patient about [facility] request. Did she get these meds from her local pharmacy?

## 2019-12-03 DIAGNOSIS — M25.512 ACUTE PAIN OF BOTH SHOULDERS: ICD-10-CM

## 2019-12-03 DIAGNOSIS — M25.511 ACUTE PAIN OF BOTH SHOULDERS: ICD-10-CM

## 2019-12-03 RX ORDER — CYCLOBENZAPRINE HCL 10 MG
10 TABLET ORAL 3 TIMES DAILY PRN
Qty: 90 TABLET | Refills: 2 | Status: SHIPPED | OUTPATIENT
Start: 2019-12-03 | End: 2021-01-12 | Stop reason: ALTCHOICE

## 2019-12-03 RX ORDER — VALACYCLOVIR HYDROCHLORIDE 500 MG/1
500 TABLET, FILM COATED ORAL 2 TIMES DAILY
Qty: 60 TABLET | Refills: 2 | Status: SHIPPED | OUTPATIENT
Start: 2019-12-03 | End: 2022-01-25 | Stop reason: ALTCHOICE

## 2019-12-03 NOTE — TELEPHONE ENCOUNTER
Medicine Refill Request    Last Office Visit: 11/19/2019  Next Office Visit: 6/8/2020    [facility] is [facility]  -Emilia singh need 90 day supply of   Valacyclovir 500mg one tab twice a day   Cyclobenzaprine 10mg   oNe tab by  Mouth tid prn    (they can do a 30 day) and remove the 14 day     [phone]        Current Outpatient Medications:   •  aspirin 81 mg enteric coated tablet, Take 81 mg by mouth 2 (two) times a week (Mon, Thu). Monday and Thursday, Disp: , Rfl:   •  atorvastatin (LIPITOR) 10 mg tablet, Take 1 tablet (10 mg total) by mouth nightly., Disp: 90 tablet, Rfl: 1  •  cyclobenzaprine (FLEXERIL) 10 mg tablet, Take 1 tablet (10 mg total) by mouth 3 (three) times a day as needed for muscle spasms for up to 14 days., Disp: 90 tablet, Rfl: 0  •  desloratadine (CLARINEX) 5 mg tablet, Take 1 tablet (5 mg total) by mouth daily., Disp: 90 tablet, Rfl: 1  •  famotidine (PEPCID) 40 mg tablet, Take 1 tablet (40 mg total) by mouth 2 (two) times a day., Disp: 180 tablet, Rfl: 3  •  latanoprost (XALATAN) 0.005 % ophthalmic solution, Administer 1 drop into both eyes daily.  , Disp: , Rfl:   •  metoprolol succinate XL (TOPROL XL) 50 mg 24 hr tablet, Take 1 tablet (50 mg total) by mouth daily., Disp: 90 tablet, Rfl: 1  •  timolol (TIMOPTIC-XR) 0.5 % ophthalmic gel-forming, Administer 1 drop into both eyes daily.  , Disp: , Rfl:   •  valACYclovir (VALTREX) 500 mg tablet, Take 1 tablet (500 mg total) by mouth 2 (two) times a day., Disp: 30 tablet, Rfl: 1      BP Readings from Last 3 Encounters:   11/19/19 130/74   05/21/19 118/74   02/26/19 (!) 138/59       Recent Lab results:  No results found for: CHOL, No results found for: HDL, No results found for: LDLCALC, No results found for: TRIG     Lab Results   Component Value Date    GLUCOSE 122 (H) 11/05/2018   , No results found for: HGBA1C      Lab Results   Component Value Date    CREATININE 0.9 11/05/2018       No results found for: TSH

## 2020-03-16 ENCOUNTER — APPOINTMENT (OUTPATIENT)
Dept: URBAN - METROPOLITAN AREA CLINIC 200 | Age: 75
Setting detail: DERMATOLOGY
End: 2020-03-23

## 2020-03-16 DIAGNOSIS — Z85.828 PERSONAL HISTORY OF OTHER MALIGNANT NEOPLASM OF SKIN: ICD-10-CM

## 2020-03-16 DIAGNOSIS — L57.8 OTHER SKIN CHANGES DUE TO CHRONIC EXPOSURE TO NONIONIZING RADIATION: ICD-10-CM

## 2020-03-16 DIAGNOSIS — L57.0 ACTINIC KERATOSIS: ICD-10-CM

## 2020-03-16 DIAGNOSIS — Z85.820 PERSONAL HISTORY OF MALIGNANT MELANOMA OF SKIN: ICD-10-CM

## 2020-03-16 DIAGNOSIS — L92.3 FOREIGN BODY GRANULOMA OF THE SKIN AND SUBCUTANEOUS TISSUE: ICD-10-CM

## 2020-03-16 DIAGNOSIS — D485 NEOPLASM OF UNCERTAIN BEHAVIOR OF SKIN: ICD-10-CM

## 2020-03-16 PROBLEM — D48.5 NEOPLASM OF UNCERTAIN BEHAVIOR OF SKIN: Status: ACTIVE | Noted: 2020-03-16

## 2020-03-16 PROCEDURE — OTHER FOREIGN BODY REMOVAL: OTHER

## 2020-03-16 PROCEDURE — OTHER BIOPSY BY SHAVE METHOD: OTHER

## 2020-03-16 PROCEDURE — OTHER LIQUID NITROGEN: OTHER

## 2020-03-16 PROCEDURE — 10120 INC&RMVL FB SUBQ TISS SMPL: CPT

## 2020-03-16 PROCEDURE — 17003 DESTRUCT PREMALG LES 2-14: CPT

## 2020-03-16 PROCEDURE — 11102 TANGNTL BX SKIN SINGLE LES: CPT

## 2020-03-16 PROCEDURE — OTHER MIPS QUALITY: OTHER

## 2020-03-16 PROCEDURE — 99213 OFFICE O/P EST LOW 20 MIN: CPT | Mod: 25

## 2020-03-16 PROCEDURE — 17000 DESTRUCT PREMALG LESION: CPT | Mod: 59

## 2020-03-16 PROCEDURE — OTHER COUNSELING: OTHER

## 2020-03-16 ASSESSMENT — LOCATION ZONE DERM
LOCATION ZONE: TRUNK
LOCATION ZONE: NOSE
LOCATION ZONE: HAND
LOCATION ZONE: NECK
LOCATION ZONE: ARM

## 2020-03-16 ASSESSMENT — LOCATION SIMPLE DESCRIPTION DERM
LOCATION SIMPLE: CHEST
LOCATION SIMPLE: LEFT ANTERIOR NECK
LOCATION SIMPLE: RIGHT HAND
LOCATION SIMPLE: NOSE
LOCATION SIMPLE: LEFT FOREARM
LOCATION SIMPLE: RIGHT UPPER BACK

## 2020-03-16 ASSESSMENT — LOCATION DETAILED DESCRIPTION DERM
LOCATION DETAILED: RIGHT RADIAL DORSAL HAND
LOCATION DETAILED: NASAL ROOT
LOCATION DETAILED: LEFT MEDIAL SUPERIOR CHEST
LOCATION DETAILED: LEFT PROXIMAL RADIAL DORSAL FOREARM
LOCATION DETAILED: LEFT INFERIOR ANTERIOR NECK
LOCATION DETAILED: LEFT DISTAL DORSAL FOREARM
LOCATION DETAILED: RIGHT SUPERIOR UPPER BACK

## 2020-03-16 ASSESSMENT — PAIN INTENSITY VAS: HOW INTENSE IS YOUR PAIN 0 BEING NO PAIN, 10 BEING THE MOST SEVERE PAIN POSSIBLE?: NO PAIN

## 2020-03-16 NOTE — PROCEDURE: BIOPSY BY SHAVE METHOD
X Size Of Lesion In Cm: 0
Bill For Surgical Tray: no
Notification Instructions: Patient will be notified of biopsy results. However, patient instructed to call the office if not contacted within 2 weeks.
Cryotherapy Text: The wound bed was treated with cryotherapy after the biopsy was performed.
Anesthesia Volume In Cc (Will Not Render If 0): 0.5
Depth Of Biopsy: dermis
Silver Nitrate Text: The wound bed was treated with silver nitrate after the biopsy was performed.
Hemostasis: Drysol
Billing Type: Third-Party Bill
Validate Note Data (See Information Below): Yes
Electrodesiccation Text: The wound bed was treated with electrodesiccation after the biopsy was performed.
Size Of Lesion In Cm: 0.4
Consent: Written consent was obtained and risks were reviewed including but not limited to scarring, infection, bleeding, scabbing, incomplete removal, nerve damage and allergy to anesthesia.
Biopsy Method: sterile single edge surgical blade
Dressing: bandage
Post-Care Instructions: I reviewed with the patient in detail post-care instructions. Patient is to keep the biopsy site dry overnight, and then apply bacitracin twice daily until healed. Patient may apply hydrogen peroxide soaks to remove any crusting.
Detail Level: Detailed
Curettage Text: The wound bed was treated with curettage after the biopsy was performed.
Information: Selecting Yes will display possible errors in your note based on the variables you have selected. This validation is only offered as a suggestion for you. PLEASE NOTE THAT THE VALIDATION TEXT WILL BE REMOVED WHEN YOU FINALIZE YOUR NOTE. IF YOU WANT TO FAX A PRELIMINARY NOTE YOU WILL NEED TO TOGGLE THIS TO 'NO' IF YOU DO NOT WANT IT IN YOUR FAXED NOTE.
Electrodesiccation And Curettage Text: The wound bed was treated with electrodesiccation and curettage after the biopsy was performed.
Type Of Destruction Used: Curettage
Biopsy Type: H and E
Wound Care: Aquaphor

## 2020-03-16 NOTE — PROCEDURE: LIQUID NITROGEN
Post-Care Instructions: I reviewed with the patient in detail post-care instructions. Patient is to wear sunprotection, and avoid picking at any of the treated lesions. Pt may apply Vaseline to crusted or scabbing areas.
Render Post-Care Instructions In Note?: no
Consent: The patient's consent was obtained including but not limited to risks of crusting, scabbing, blistering, scarring, darker or lighter pigmentary change, recurrence, incomplete removal and infection.
Number Of Freeze-Thaw Cycles: 1 freeze-thaw cycle
Detail Level: Detailed
Duration Of Freeze Thaw-Cycle (Seconds): 2

## 2020-03-16 NOTE — PROCEDURE: FOREIGN BODY REMOVAL
Anesthesia Volume In Cc: 0
Bill For Simple Or Complicated Fb Removal?: simple
Detail Level: Detailed

## 2020-06-19 DIAGNOSIS — E78.00 PURE HYPERCHOLESTEROLEMIA: ICD-10-CM

## 2020-06-19 DIAGNOSIS — I47.10 PAROXYSMAL SUPRAVENTRICULAR TACHYCARDIA (CMS/HCC): ICD-10-CM

## 2020-06-19 RX ORDER — ATORVASTATIN CALCIUM 10 MG/1
10 TABLET, FILM COATED ORAL NIGHTLY
Qty: 90 TABLET | Refills: 0 | Status: SHIPPED | OUTPATIENT
Start: 2020-06-19 | End: 2020-07-13 | Stop reason: SDUPTHER

## 2020-06-19 RX ORDER — METOPROLOL SUCCINATE 50 MG/1
50 TABLET, EXTENDED RELEASE ORAL DAILY
Qty: 90 TABLET | Refills: 0 | Status: SHIPPED | OUTPATIENT
Start: 2020-06-19 | End: 2020-07-13 | Stop reason: SDUPTHER

## 2020-06-19 RX ORDER — DESLORATADINE 5 MG/1
5 TABLET ORAL DAILY
Qty: 90 TABLET | Refills: 0 | Status: SHIPPED | OUTPATIENT
Start: 2020-06-19 | End: 2020-07-13 | Stop reason: SDUPTHER

## 2020-06-19 NOTE — TELEPHONE ENCOUNTER
She will run before appointment and I dont think she takes 10 mg of clarinex it does not come in that dose

## 2020-06-19 NOTE — TELEPHONE ENCOUNTER
Medicine Refill Request    Last Office Visit: 11/19/2019  Last Telemedicine Visit: Visit date not found    Next Office Visit: 7/13/2020  Next Telemedicine Visit: Visit date not found     Pt lm for meds I did not get all please call and clarify     Current Outpatient Medications:   •  aspirin 81 mg enteric coated tablet, Take 81 mg by mouth 2 (two) times a week (Mon, Thu). Monday and Thursday, Disp: , Rfl:   •  atorvastatin (LIPITOR) 10 mg tablet, Take 1 tablet (10 mg total) by mouth nightly., Disp: 90 tablet, Rfl: 1  •  cyclobenzaprine (FLEXERIL) 10 mg tablet, Take 1 tablet (10 mg total) by mouth 3 (three) times a day as needed for muscle spasms., Disp: 90 tablet, Rfl: 2  •  desloratadine (CLARINEX) 5 mg tablet, Take 1 tablet (5 mg total) by mouth daily., Disp: 90 tablet, Rfl: 1  •  famotidine (PEPCID) 40 mg tablet, Take 1 tablet (40 mg total) by mouth 2 (two) times a day., Disp: 180 tablet, Rfl: 3  •  latanoprost (XALATAN) 0.005 % ophthalmic solution, Administer 1 drop into both eyes daily.  , Disp: , Rfl:   •  metoprolol succinate XL (TOPROL XL) 50 mg 24 hr tablet, Take 1 tablet (50 mg total) by mouth daily., Disp: 90 tablet, Rfl: 1  •  timolol (TIMOPTIC-XR) 0.5 % ophthalmic gel-forming, Administer 1 drop into both eyes daily.  , Disp: , Rfl:   •  valACYclovir (VALTREX) 500 mg tablet, Take 1 tablet (500 mg total) by mouth 2 (two) times a day., Disp: 60 tablet, Rfl: 2      BP Readings from Last 3 Encounters:   11/19/19 130/74   05/21/19 118/74   02/26/19 (!) 138/59       Recent Lab results:  No results found for: CHOL, No results found for: HDL, No results found for: LDLCALC, No results found for: TRIG     Lab Results   Component Value Date    GLUCOSE 122 (H) 11/05/2018   , No results found for: HGBA1C      Lab Results   Component Value Date    CREATININE 0.9 11/05/2018       No results found for: TSH

## 2020-07-13 ENCOUNTER — OFFICE VISIT (OUTPATIENT)
Dept: PRIMARY CARE | Facility: CLINIC | Age: 75
End: 2020-07-13
Payer: MEDICARE

## 2020-07-13 VITALS
RESPIRATION RATE: 16 BRPM | SYSTOLIC BLOOD PRESSURE: 138 MMHG | WEIGHT: 176 LBS | TEMPERATURE: 98.4 F | DIASTOLIC BLOOD PRESSURE: 80 MMHG | HEIGHT: 66 IN | HEART RATE: 72 BPM | OXYGEN SATURATION: 95 % | BODY MASS INDEX: 28.28 KG/M2

## 2020-07-13 DIAGNOSIS — I47.10 PAROXYSMAL SUPRAVENTRICULAR TACHYCARDIA (CMS/HCC): ICD-10-CM

## 2020-07-13 DIAGNOSIS — J30.9 ALLERGIC RHINITIS, UNSPECIFIED SEASONALITY, UNSPECIFIED TRIGGER: ICD-10-CM

## 2020-07-13 DIAGNOSIS — K21.00 ESOPHAGITIS, REFLUX: ICD-10-CM

## 2020-07-13 DIAGNOSIS — R73.01 IMPAIRED FASTING GLUCOSE: Primary | ICD-10-CM

## 2020-07-13 DIAGNOSIS — E78.00 PURE HYPERCHOLESTEROLEMIA: ICD-10-CM

## 2020-07-13 PROCEDURE — 99214 OFFICE O/P EST MOD 30 MIN: CPT | Performed by: INTERNAL MEDICINE

## 2020-07-13 RX ORDER — ATORVASTATIN CALCIUM 10 MG/1
10 TABLET, FILM COATED ORAL NIGHTLY
Qty: 90 TABLET | Refills: 1 | Status: SHIPPED | OUTPATIENT
Start: 2020-07-13 | End: 2021-01-12 | Stop reason: SDUPTHER

## 2020-07-13 RX ORDER — FAMOTIDINE 40 MG/1
40 TABLET, FILM COATED ORAL
Qty: 180 TABLET | Refills: 3 | Status: SHIPPED | OUTPATIENT
Start: 2020-07-13 | End: 2021-01-12 | Stop reason: SDUPTHER

## 2020-07-13 RX ORDER — DESLORATADINE 5 MG/1
5 TABLET ORAL DAILY
Qty: 90 TABLET | Refills: 0 | Status: SHIPPED | OUTPATIENT
Start: 2020-07-13 | End: 2020-12-15 | Stop reason: SDUPTHER

## 2020-07-13 RX ORDER — METOPROLOL SUCCINATE 50 MG/1
50 TABLET, EXTENDED RELEASE ORAL DAILY
Qty: 90 TABLET | Refills: 0 | Status: SHIPPED | OUTPATIENT
Start: 2020-07-13 | End: 2020-11-30 | Stop reason: SDUPTHER

## 2020-07-13 NOTE — PROGRESS NOTES
[facility]  Dr. Veronica Mars  [address], [address]  [place] [address]  Phone: [phone]  Fax: [phone]      Patient ID: Bev Martinez                              : 1945    Visit Date: 2020    Chief Complaint: Follow-up (med check, post lab results )      Patient ID: Bev Martinez is a 75 y.o. female.    HPI  Primary reason for visit is she needs refills on medication     Glad to be in the green phase  Went to gym 5 times last week - but swim team started practices this week so will decrease times  Cannot walk far > 1/2 mile at a time due to back pain - frustrating for her  Swimming keeps this pain under control and has for years    Weight the same as last visit here - wants to get more off again  Allergies under reasonable control with current medication - Clarinex    Labs done 2020 reviewed with patient - a abnormal, , A1c 6.1 and   She denies urinary pain, flank pain, frequency or urgency and reports ua's often show contaimination    Hyperlipidemia   This is a chronic problem. The current episode started more than 1 year ago. The problem is controlled (for current risk). Recent lipid tests were reviewed and are normal. Exacerbating diseases include obesity. She has no history of chronic renal disease, diabetes, hypothyroidism, liver disease or nephrotic syndrome. Factors aggravating her hyperlipidemia include beta blockers. Pertinent negatives include no chest pain, focal weakness or shortness of breath. Current antihyperlipidemic treatment includes statins. The current treatment provides moderate improvement of lipids. Risk factors for coronary artery disease include dyslipidemia, obesity and post-menopausal.   Palpitations    This is a chronic problem. The current episode started more than 1 year ago. The problem occurs rarely. Pertinent negatives include no chest pain, dizziness, irregular heartbeat, near-syncope,  shortness of breath or syncope. She has tried beta blockers for the symptoms. The treatment provided significant relief. There is no history of anemia, drug use, hyperthyroidism or a valve disorder.   Reflux / GERD   She reports no abdominal pain, no chest pain, no dysphagia, no hoarse voice or no wheezing. This is a chronic problem. The problem occurs rarely. She has tried a histamine-2 antagonist for the symptoms. The treatment provided significant relief.         Patient Active Problem List   Diagnosis   • Allergic rhinitis   • Calculus of kidney   • Esophagitis, reflux   • Fatty liver   • History of colon polyps   • Impaired fasting glucose   • Overweight   • Paroxysmal supraventricular tachycardia (CMS/HCC)   • Pure hypercholesterolemia   • Malignant melanoma (CMS/HCC)   • Left cataract   • Abnormal finding on urinalysis   • Herpes simplex   • Acute pain of both shoulders         Current Outpatient Medications:   •  aspirin 81 mg enteric coated tablet, Take 81 mg by mouth 2 (two) times a week (Mon, Thu). Monday and Thursday, Disp: , Rfl:   •  atorvastatin (LIPITOR) 10 mg tablet, Take 1 tablet (10 mg total) by mouth nightly., Disp: 90 tablet, Rfl: 1  •  cyclobenzaprine (FLEXERIL) 10 mg tablet, Take 1 tablet (10 mg total) by mouth 3 (three) times a day as needed for muscle spasms., Disp: 90 tablet, Rfl: 2  •  desloratadine (CLARINEX) 5 mg tablet, Take 1 tablet (5 mg total) by mouth daily., Disp: 90 tablet, Rfl: 0  •  famotidine (PEPCID) 40 mg tablet, Take 1 tablet (40 mg total) by mouth 2 (two) times a day., Disp: 180 tablet, Rfl: 3  •  latanoprost (XALATAN) 0.005 % ophthalmic solution, Administer 1 drop into both eyes daily.  , Disp: , Rfl:   •  metoprolol succinate XL (TOPROL XL) 50 mg 24 hr tablet, Take 1 tablet (50 mg total) by mouth daily., Disp: 90 tablet, Rfl: 0  •  timolol (TIMOPTIC-XR) 0.5 % ophthalmic gel-forming, Administer 1 drop into both eyes daily.  , Disp: , Rfl:   •  valACYclovir (VALTREX) 500 mg  "tablet, Take 1 tablet (500 mg total) by mouth 2 (two) times a day., Disp: 60 tablet, Rfl: 2    Allergies   Allergen Reactions   • Adhesive Tape-Silicones Other (see comments)     \"Skin Burn\"   • Ambien [Zolpidem] Other (see comments)     Amnesia   • Dexilant [Dexlansoprazole] GI intolerance   • Sulfa (Sulfonamide Antibiotics) Rash       Social History     Tobacco Use   • Smoking status: Never Smoker   • Smokeless tobacco: Never Used   Substance Use Topics   • Alcohol use: No   • Drug use: No       Health Maintenance   Topic Date Due   • DEXA Scan  1945   • Varicella Vaccines (1 of 2 - 2-dose childhood series) 01/31/1946   • Medicare Annual Wellness Visit  01/31/1963   • Pneumococcal (65 years and older) (2 of 2 - PPSV23) 07/11/2017   • Annual Falls Risk Screening  01/01/2020   • Influenza Vaccine (1) 08/01/2020   • Mammogram  05/06/2021   • Colonoscopy  11/11/2026   • DTaP, Tdap, and Td Vaccines (2 - Td) 12/03/2029   • Zoster Vaccine  Completed   • Hepatitis C Screening  Completed   • Meningococcal ACWY  Aged Out   • HIB Vaccines  Aged Out   • IPV Vaccines  Aged Out   • HPV Vaccines  Aged Out   • Pneumococcal  Aged Out       Past Medical History:   Diagnosis Date   • Allergic rhinitis    • Calculus of kidney     Spontaneously passed   • Cataracts, bilateral    • Chronic pain     Back Pain   • Esophagitis, reflux    • Essential hypertension, benign    • Fatty liver    • Glaucoma    • H/O cardiovascular stress test    • History of colon polyps    • Impaired fasting glucose     Blood Glucose usually 115-124 in am   • Macular degeneration    • Malignant melanoma (CMS/HCC) 2010    Left Arm   • Migraines     Last migraine 30 years ago   • Overweight    • Paroxysmal supraventricular tachycardia (CMS/HCC)     Controlled with Toprol   • Prediabetes    • Pure hypercholesterolemia    • Skin cancer     Squamous cell Face   • Small bowel obstruction (CMS/HCC)     Caused by Scar tissue from Tubal Ligation   • UTI (urinary " "tract infection) 04/2018    Develped an SVT with UTI and followed by cardiology       The following have been reviewed and updated as appropriate in this visit:  Allergies  Meds  Problems         Review of System  Review of Systems   Constitutional: Positive for activity change (decreased during last 4 months). Negative for unexpected weight change.   HENT: Negative for hoarse voice.    Respiratory: Negative for shortness of breath and wheezing.    Cardiovascular: Positive for palpitations. Negative for chest pain, syncope and near-syncope.   Gastrointestinal: Negative for abdominal pain and dysphagia.   Neurological: Negative for dizziness and focal weakness.       Objective     Vitals  Vitals:    07/13/20 1343   BP: 138/80   BP Location: Left upper arm   Patient Position: Sitting   Pulse: 72   Resp: 16   Temp: 36.9 °C (98.4 °F)   TempSrc: Oral   SpO2: 95%   Weight: 79.8 kg (176 lb)   Height: 1.664 m (5' 5.5\")       Body mass index is 28.84 kg/m².    Physical Exam  Physical Exam   Constitutional: She is oriented to person, place, and time. She appears well-nourished.   Neck: Neck supple. No thyromegaly present.   Cardiovascular: Normal rate, regular rhythm and normal heart sounds. Exam reveals no gallop.   No murmur heard.  Pulmonary/Chest: Effort normal and breath sounds normal.   Abdominal: Bowel sounds are normal.   Musculoskeletal: She exhibits no edema.   Neurological: She is oriented to person, place, and time. Gait normal.   Skin: Skin is warm and dry.   Psychiatric: She has a normal mood and affect.   Vitals reviewed.      Assessment/Plan     Problem List Items Addressed This Visit        Respiratory    Allergic rhinitis     Stable  Script for antihistamine given as requested         Relevant Medications    desloratadine (CLARINEX) 5 mg tablet       Circulatory    Paroxysmal supraventricular tachycardia (CMS/HCC)     Stable  Continue current medication  Follow up with cardiologist as requested by this " specialist         Relevant Medications    atorvastatin (LIPITOR) 10 mg tablet    metoprolol succinate XL (TOPROL XL) 50 mg 24 hr tablet       Digestive    Esophagitis, reflux     Stable  Continue current medication   Benefit of weight reduction discussed         Relevant Medications    famotidine (PEPCID) 40 mg tablet       Endocrine/Metabolic    Impaired fasting glucose - Primary     Discussed elevated FBS  Encouraged to decrease weight and adhere to healthy diet to avoid onset of DM          Pure hypercholesterolemia     At current goal for risk  Adhere to low fat diet  Exercise as planned  Continue current medication dose for now         Relevant Medications    atorvastatin (LIPITOR) 10 mg tablet        Flu shot in the fall  Return in about 6 months (around 1/13/2021).      Veronica Mars MD  7/17/2020

## 2020-07-17 ASSESSMENT — ENCOUNTER SYMPTOMS
PALPITATIONS: 1
NEAR-SYNCOPE: 0
IRREGULAR HEARTBEAT: 0
WHEEZING: 0
UNEXPECTED WEIGHT CHANGE: 0
HOARSE VOICE: 0
SYNCOPE: 0
ABDOMINAL PAIN: 0
ACTIVITY CHANGE: 1
SHORTNESS OF BREATH: 0
DIZZINESS: 0
FOCAL WEAKNESS: 0

## 2020-07-17 NOTE — ASSESSMENT & PLAN NOTE
Discussed elevated FBS  Encouraged to decrease weight and adhere to healthy diet to avoid onset of DM

## 2020-07-17 NOTE — ASSESSMENT & PLAN NOTE
At current goal for risk  Adhere to low fat diet  Exercise as planned  Continue current medication dose for now

## 2020-07-30 ENCOUNTER — OFFICE VISIT (OUTPATIENT)
Dept: PRIMARY CARE | Facility: CLINIC | Age: 75
End: 2020-07-30
Payer: MEDICARE

## 2020-07-30 VITALS
HEART RATE: 58 BPM | HEIGHT: 66 IN | SYSTOLIC BLOOD PRESSURE: 120 MMHG | DIASTOLIC BLOOD PRESSURE: 70 MMHG | OXYGEN SATURATION: 98 % | BODY MASS INDEX: 28.12 KG/M2 | TEMPERATURE: 98.7 F | WEIGHT: 175 LBS

## 2020-07-30 DIAGNOSIS — L03.011 PARONYCHIA OF RIGHT THUMB: Primary | ICD-10-CM

## 2020-07-30 DIAGNOSIS — C43.9 MALIGNANT MELANOMA, UNSPECIFIED SITE (CMS/HCC): ICD-10-CM

## 2020-07-30 PROCEDURE — 99214 OFFICE O/P EST MOD 30 MIN: CPT | Performed by: NURSE PRACTITIONER

## 2020-07-30 RX ORDER — CEPHALEXIN 500 MG/1
CAPSULE ORAL
COMMUNITY
Start: 2020-07-29 | End: 2021-01-12 | Stop reason: ALTCHOICE

## 2020-07-30 RX ORDER — CIPROFLOXACIN 500 MG/1
500 TABLET ORAL 2 TIMES DAILY
Qty: 14 TABLET | Refills: 0 | Status: SHIPPED | OUTPATIENT
Start: 2020-07-30 | End: 2020-08-06

## 2020-07-30 ASSESSMENT — ENCOUNTER SYMPTOMS
FEVER: 0
ARTHRALGIAS: 1
CHILLS: 0
CARDIOVASCULAR NEGATIVE: 1
RESPIRATORY NEGATIVE: 1
UNEXPECTED WEIGHT CHANGE: 0
WOUND: 0
COLOR CHANGE: 1
DIAPHORESIS: 0
FATIGUE: 0
JOINT SWELLING: 0

## 2020-07-30 NOTE — ASSESSMENT & PLAN NOTE
Continue Keflex  Wm compress BID  Keep clean and dry.  Report status tomorrow AM  To consider adding med to cover gram neg.  Pt cannot take Augmentin.

## 2020-07-30 NOTE — PROGRESS NOTES
[facility]  Nevin Garrett  [address], [address]  [place] [address]  Phone: [phone]  Fax: [phone]      Patient ID: Bev Martinez                              : 1945    Visit Date: 2020    Chief Complaint: Cellulitis (right thumb)         Patient ID: Bev Martinez is a 75 y.o. female.    Patient Active Problem List   Diagnosis   • Allergic rhinitis   • Calculus of kidney   • Esophagitis, reflux   • Fatty liver   • History of colon polyps   • Impaired fasting glucose   • Overweight   • Paroxysmal supraventricular tachycardia (CMS/HCC)   • Pure hypercholesterolemia   • Malignant melanoma (CMS/HCC)   • Left cataract   • Abnormal finding on urinalysis   • Herpes simplex   • Acute pain of both shoulders   • Paronychia of right thumb         Current Outpatient Medications:   •  aspirin 81 mg enteric coated tablet, Take 81 mg by mouth 2 (two) times a week (Mon, Thu). Monday and Thursday, Disp: , Rfl:   •  atorvastatin (LIPITOR) 10 mg tablet, Take 1 tablet (10 mg total) by mouth nightly., Disp: 90 tablet, Rfl: 1  •  cephalexin (KEFLEX) 500 mg capsule, , Disp: , Rfl:   •  cyclobenzaprine (FLEXERIL) 10 mg tablet, Take 1 tablet (10 mg total) by mouth 3 (three) times a day as needed for muscle spasms., Disp: 90 tablet, Rfl: 2  •  desloratadine (CLARINEX) 5 mg tablet, Take 1 tablet (5 mg total) by mouth daily., Disp: 90 tablet, Rfl: 0  •  famotidine (PEPCID) 40 mg tablet, Take 1 tablet (40 mg total) by mouth 2 (two) times a day., Disp: 180 tablet, Rfl: 3  •  latanoprost (XALATAN) 0.005 % ophthalmic solution, Administer 1 drop into both eyes daily.  , Disp: , Rfl:   •  metoprolol succinate XL (TOPROL XL) 50 mg 24 hr tablet, Take 1 tablet (50 mg total) by mouth daily., Disp: 90 tablet, Rfl: 0  •  timolol (TIMOPTIC-XR) 0.5 % ophthalmic gel-forming, Administer 1 drop into both eyes daily.  , Disp: , Rfl:   •  ciprofloxacin (CIPRO) 500 mg tablet, Take 1  "tablet (500 mg total) by mouth 2 (two) times a day for 7 days., Disp: 14 tablet, Rfl: 0  •  valACYclovir (VALTREX) 500 mg tablet, Take 1 tablet (500 mg total) by mouth 2 (two) times a day., Disp: 60 tablet, Rfl: 2    Allergies   Allergen Reactions   • Adhesive Tape-Silicones Other (see comments)     \"Skin Burn\"   • Ambien [Zolpidem] Other (see comments)     Amnesia   • Dexilant [Dexlansoprazole] GI intolerance   • Sulfa (Sulfonamide Antibiotics) Rash       Social History     Tobacco Use   • Smoking status: Never Smoker   • Smokeless tobacco: Never Used   Substance Use Topics   • Alcohol use: No   • Drug use: No       Health Maintenance   Topic Date Due   • DEXA Scan  1945   • Varicella Vaccines (1 of 2 - 2-dose childhood series) 01/31/1946   • Medicare Annual Wellness Visit  01/31/1963   • Pneumococcal (65 years and older) (2 of 2 - PPSV23) 07/11/2017   • Annual Falls Risk Screening  01/01/2020   • Influenza Vaccine (1) 08/01/2020   • Mammogram  05/06/2021   • Colonoscopy  11/11/2026   • DTaP, Tdap, and Td Vaccines (2 - Td) 12/03/2029   • Zoster Vaccine  Completed   • Hepatitis C Screening  Completed   • Meningococcal ACWY  Aged Out   • HIB Vaccines  Aged Out   • IPV Vaccines  Aged Out   • HPV Vaccines  Aged Out   • Pneumococcal  Aged Out       HPI  Infection under R thumb nail into distal finger.  Onset in the past 7 days.  + redness, swelling and pain.   No fever, chills.  No red streaking.   Mild joint pain  Was started on Keflex last night--seen at urgent care.  She has had 4 doses now and is worried it is not improving.  She is asking to start a different antibiotic--she is worried.      The following have been reviewed and updated as appropriate in this visit:  Allergies  Meds  Problems         Review of System  Review of Systems   Constitutional: Negative for chills, diaphoresis, fatigue, fever and unexpected weight change.   Respiratory: Negative.    Cardiovascular: Negative.    Musculoskeletal: " "Positive for arthralgias. Negative for joint swelling.   Skin: Positive for color change. Negative for wound.       Objective     Vitals  Vitals:    07/30/20 1423   BP: 120/70   BP Location: Left upper arm   Patient Position: Sitting   Pulse: (!) 58   Temp: 37.1 °C (98.7 °F)   SpO2: 98%   Weight: 79.4 kg (175 lb)   Height: 1.664 m (5' 5.5\")     Body mass index is 28.68 kg/m².    Physical Exam  Physical Exam   Constitutional: She is oriented to person, place, and time. She appears well-developed and well-nourished. No distress.   Cardiovascular: Normal rate, regular rhythm and normal heart sounds. Exam reveals no gallop and no friction rub.   No murmur heard.  Pulmonary/Chest: Effort normal and breath sounds normal. No respiratory distress. She has no wheezes. She has no rales.   Neurological: She is alert and oriented to person, place, and time.   Skin: She is not diaphoretic.   R thumb with erythema, edema distally. Nail is thickened and discolored. Distal thumb is very tender.  No red streaking into hand or arm. NL ROM.   Vitals reviewed.      Assessment/Plan     Problem List Items Addressed This Visit     Malignant melanoma (CMS/HCC)     No recurrence.  DERM follows regularly.         Paronychia of right thumb - Primary     Continue Keflex  Wm compress BID  Keep clean and dry.  Report status tomorrow AM  To consider adding med to cover gram neg.  Pt cannot take Augmentin.         Relevant Medications    cephalexin (KEFLEX) 500 mg capsule    ciprofloxacin (CIPRO) 500 mg tablet              MARIANNE Gandhi  7/30/2020  "

## 2020-08-04 ENCOUNTER — TELEPHONE (OUTPATIENT)
Dept: PRIMARY CARE | Facility: CLINIC | Age: 75
End: 2020-08-04

## 2020-08-04 NOTE — TELEPHONE ENCOUNTER
Patient said Nevin wanted patient to call back and update her on her right thumb.  When I asked for the message patient said Nevin can call her.  I told patient it might not be until later today.

## 2020-08-04 NOTE — TELEPHONE ENCOUNTER
SW pt thumb drainage wanted to know if she should complete the 10 days of keflex or can she stop at 7 she said it is better not worst and do you want to see her again or because its okay can she not come back in

## 2020-09-14 ENCOUNTER — APPOINTMENT (OUTPATIENT)
Dept: URBAN - METROPOLITAN AREA CLINIC 200 | Age: 75
Setting detail: DERMATOLOGY
End: 2020-09-30

## 2020-09-14 DIAGNOSIS — Z85.820 PERSONAL HISTORY OF MALIGNANT MELANOMA OF SKIN: ICD-10-CM

## 2020-09-14 DIAGNOSIS — Z85.828 PERSONAL HISTORY OF OTHER MALIGNANT NEOPLASM OF SKIN: ICD-10-CM

## 2020-09-14 DIAGNOSIS — L57.8 OTHER SKIN CHANGES DUE TO CHRONIC EXPOSURE TO NONIONIZING RADIATION: ICD-10-CM

## 2020-09-14 DIAGNOSIS — L82.0 INFLAMED SEBORRHEIC KERATOSIS: ICD-10-CM

## 2020-09-14 DIAGNOSIS — L57.0 ACTINIC KERATOSIS: ICD-10-CM

## 2020-09-14 PROBLEM — J30.1 ALLERGIC RHINITIS DUE TO POLLEN: Status: ACTIVE | Noted: 2020-09-14

## 2020-09-14 PROCEDURE — OTHER COUNSELING: OTHER

## 2020-09-14 PROCEDURE — OTHER REASSURANCE: OTHER

## 2020-09-14 PROCEDURE — OTHER LIQUID NITROGEN: OTHER

## 2020-09-14 PROCEDURE — 99213 OFFICE O/P EST LOW 20 MIN: CPT | Mod: 25

## 2020-09-14 PROCEDURE — 17000 DESTRUCT PREMALG LESION: CPT

## 2020-09-14 ASSESSMENT — LOCATION DETAILED DESCRIPTION DERM
LOCATION DETAILED: LEFT MEDIAL SUPERIOR CHEST
LOCATION DETAILED: LEFT INFERIOR ANTERIOR NECK
LOCATION DETAILED: RIGHT LATERAL PROXIMAL UPPER ARM
LOCATION DETAILED: RIGHT AXILLARY VAULT

## 2020-09-14 ASSESSMENT — LOCATION SIMPLE DESCRIPTION DERM
LOCATION SIMPLE: RIGHT AXILLARY VAULT
LOCATION SIMPLE: CHEST
LOCATION SIMPLE: LEFT ANTERIOR NECK
LOCATION SIMPLE: RIGHT UPPER ARM

## 2020-09-14 ASSESSMENT — LOCATION ZONE DERM
LOCATION ZONE: AXILLAE
LOCATION ZONE: NECK
LOCATION ZONE: TRUNK
LOCATION ZONE: ARM

## 2020-09-14 ASSESSMENT — PAIN INTENSITY VAS: HOW INTENSE IS YOUR PAIN 0 BEING NO PAIN, 10 BEING THE MOST SEVERE PAIN POSSIBLE?: NO PAIN

## 2020-09-14 NOTE — PROCEDURE: LIQUID NITROGEN
Number Of Freeze-Thaw Cycles: 1 freeze-thaw cycle
Consent: The patient's consent was obtained including but not limited to risks of crusting, scabbing, blistering, scarring, darker or lighter pigmentary change, recurrence, incomplete removal and infection.
Render Note In Bullet Format When Appropriate: No
Duration Of Freeze Thaw-Cycle (Seconds): 2
Detail Level: Generalized
Post-Care Instructions: I reviewed with the patient in detail post-care instructions. Patient is to wear sunprotection, and avoid picking at any of the treated lesions. Pt may apply Vaseline to crusted or scabbing areas.

## 2020-11-30 DIAGNOSIS — I47.10 PAROXYSMAL SUPRAVENTRICULAR TACHYCARDIA (CMS/HCC): ICD-10-CM

## 2020-11-30 RX ORDER — METOPROLOL SUCCINATE 50 MG/1
50 TABLET, EXTENDED RELEASE ORAL DAILY
Qty: 90 TABLET | Refills: 0 | Status: SHIPPED | OUTPATIENT
Start: 2020-11-30 | End: 2020-12-07 | Stop reason: SDUPTHER

## 2020-12-07 DIAGNOSIS — I47.10 PAROXYSMAL SUPRAVENTRICULAR TACHYCARDIA (CMS/HCC): ICD-10-CM

## 2020-12-07 NOTE — TELEPHONE ENCOUNTER
Medicine Refill Request    Last Office Visit: 7/30/2020  Last Telemedicine Visit: Visit date not found  Labs in July 2020  Next Office Visit: 1/12/2021  Next Telemedicine Visit: Visit date not found         Current Outpatient Medications:   •  aspirin 81 mg enteric coated tablet, Take 81 mg by mouth 2 (two) times a week (Mon, Thu). Monday and Thursday, Disp: , Rfl:   •  atorvastatin (LIPITOR) 10 mg tablet, Take 1 tablet (10 mg total) by mouth nightly., Disp: 90 tablet, Rfl: 1  •  cephalexin (KEFLEX) 500 mg capsule, , Disp: , Rfl:   •  cyclobenzaprine (FLEXERIL) 10 mg tablet, Take 1 tablet (10 mg total) by mouth 3 (three) times a day as needed for muscle spasms., Disp: 90 tablet, Rfl: 2  •  desloratadine (CLARINEX) 5 mg tablet, Take 1 tablet (5 mg total) by mouth daily., Disp: 90 tablet, Rfl: 0  •  famotidine (PEPCID) 40 mg tablet, Take 1 tablet (40 mg total) by mouth 2 (two) times a day., Disp: 180 tablet, Rfl: 3  •  latanoprost (XALATAN) 0.005 % ophthalmic solution, Administer 1 drop into both eyes daily.  , Disp: , Rfl:   •  metoprolol succinate XL (TOPROL XL) 50 mg 24 hr tablet, Take 1 tablet (50 mg total) by mouth daily., Disp: 90 tablet, Rfl: 0  •  timolol (TIMOPTIC-XR) 0.5 % ophthalmic gel-forming, Administer 1 drop into both eyes daily.  , Disp: , Rfl:   •  valACYclovir (VALTREX) 500 mg tablet, Take 1 tablet (500 mg total) by mouth 2 (two) times a day., Disp: 60 tablet, Rfl: 2      BP Readings from Last 3 Encounters:   07/30/20 120/70   07/13/20 138/80   11/19/19 130/74       Recent Lab results:  No results found for: CHOL, No results found for: HDL, No results found for: LDLCALC, No results found for: TRIG     Lab Results   Component Value Date    GLUCOSE 122 (H) 11/05/2018   , No results found for: HGBA1C      Lab Results   Component Value Date    CREATININE 0.9 11/05/2018       No results found for: TSH

## 2020-12-08 RX ORDER — METOPROLOL SUCCINATE 50 MG/1
50 TABLET, EXTENDED RELEASE ORAL DAILY
Qty: 90 TABLET | Refills: 0 | Status: SHIPPED | OUTPATIENT
Start: 2020-12-08 | End: 2021-01-12 | Stop reason: SDUPTHER

## 2020-12-15 DIAGNOSIS — J30.9 ALLERGIC RHINITIS, UNSPECIFIED SEASONALITY, UNSPECIFIED TRIGGER: ICD-10-CM

## 2020-12-15 RX ORDER — DESLORATADINE 5 MG/1
5 TABLET ORAL DAILY
Qty: 90 TABLET | Refills: 0 | Status: SHIPPED | OUTPATIENT
Start: 2020-12-15 | End: 2021-01-12 | Stop reason: SDUPTHER

## 2020-12-15 NOTE — TELEPHONE ENCOUNTER
Medicine Refill Request    Last Office Visit: 7/30/2020  Last Telemedicine Visit: Visit date not found    Next Office Visit: 1/12/2021  Next Telemedicine Visit: Visit date not found   Pt called for refill       Current Outpatient Medications:   •  aspirin 81 mg enteric coated tablet, Take 81 mg by mouth 2 (two) times a week (Mon, Thu). Monday and Thursday, Disp: , Rfl:   •  atorvastatin (LIPITOR) 10 mg tablet, Take 1 tablet (10 mg total) by mouth nightly., Disp: 90 tablet, Rfl: 1  •  cephalexin (KEFLEX) 500 mg capsule, , Disp: , Rfl:   •  cyclobenzaprine (FLEXERIL) 10 mg tablet, Take 1 tablet (10 mg total) by mouth 3 (three) times a day as needed for muscle spasms., Disp: 90 tablet, Rfl: 2  •  desloratadine (CLARINEX) 5 mg tablet, Take 1 tablet (5 mg total) by mouth daily., Disp: 90 tablet, Rfl: 0  •  famotidine (PEPCID) 40 mg tablet, Take 1 tablet (40 mg total) by mouth 2 (two) times a day., Disp: 180 tablet, Rfl: 3  •  latanoprost (XALATAN) 0.005 % ophthalmic solution, Administer 1 drop into both eyes daily.  , Disp: , Rfl:   •  metoprolol succinate XL (TOPROL XL) 50 mg 24 hr tablet, Take 1 tablet (50 mg total) by mouth daily., Disp: 90 tablet, Rfl: 0  •  timolol (TIMOPTIC-XR) 0.5 % ophthalmic gel-forming, Administer 1 drop into both eyes daily.  , Disp: , Rfl:   •  valACYclovir (VALTREX) 500 mg tablet, Take 1 tablet (500 mg total) by mouth 2 (two) times a day., Disp: 60 tablet, Rfl: 2      BP Readings from Last 3 Encounters:   07/30/20 120/70   07/13/20 138/80   11/19/19 130/74       Recent Lab results:  No results found for: CHOL, No results found for: HDL, No results found for: LDLCALC, No results found for: TRIG     Lab Results   Component Value Date    GLUCOSE 122 (H) 11/05/2018   , No results found for: HGBA1C      Lab Results   Component Value Date    CREATININE 0.9 11/05/2018       No results found for: TSH

## 2021-01-12 ENCOUNTER — OFFICE VISIT (OUTPATIENT)
Dept: PRIMARY CARE | Facility: CLINIC | Age: 76
End: 2021-01-12
Payer: MEDICARE

## 2021-01-12 VITALS
WEIGHT: 181 LBS | BODY MASS INDEX: 30.16 KG/M2 | OXYGEN SATURATION: 98 % | RESPIRATION RATE: 16 BRPM | DIASTOLIC BLOOD PRESSURE: 64 MMHG | HEART RATE: 67 BPM | TEMPERATURE: 96 F | SYSTOLIC BLOOD PRESSURE: 127 MMHG | HEIGHT: 65 IN

## 2021-01-12 DIAGNOSIS — N20.0 CALCULUS OF KIDNEY: ICD-10-CM

## 2021-01-12 DIAGNOSIS — C43.9 MALIGNANT MELANOMA, UNSPECIFIED SITE (CMS/HCC): ICD-10-CM

## 2021-01-12 DIAGNOSIS — J30.9 ALLERGIC RHINITIS, UNSPECIFIED SEASONALITY, UNSPECIFIED TRIGGER: ICD-10-CM

## 2021-01-12 DIAGNOSIS — R73.01 IMPAIRED FASTING GLUCOSE: Primary | ICD-10-CM

## 2021-01-12 DIAGNOSIS — I47.10 PAROXYSMAL SUPRAVENTRICULAR TACHYCARDIA (CMS/HCC): ICD-10-CM

## 2021-01-12 DIAGNOSIS — E78.00 PURE HYPERCHOLESTEROLEMIA: ICD-10-CM

## 2021-01-12 DIAGNOSIS — K21.00 ESOPHAGITIS, REFLUX: ICD-10-CM

## 2021-01-12 PROCEDURE — 99214 OFFICE O/P EST MOD 30 MIN: CPT | Performed by: INTERNAL MEDICINE

## 2021-01-12 RX ORDER — DESLORATADINE 5 MG/1
5 TABLET ORAL DAILY
Qty: 90 TABLET | Refills: 2 | Status: SHIPPED | OUTPATIENT
Start: 2021-01-12 | End: 2021-09-03 | Stop reason: SDUPTHER

## 2021-01-12 RX ORDER — METOPROLOL SUCCINATE 50 MG/1
50 TABLET, EXTENDED RELEASE ORAL DAILY
Qty: 90 TABLET | Refills: 2 | Status: SHIPPED | OUTPATIENT
Start: 2021-01-12 | End: 2021-09-03 | Stop reason: SDUPTHER

## 2021-01-12 RX ORDER — ATORVASTATIN CALCIUM 10 MG/1
10 TABLET, FILM COATED ORAL NIGHTLY
Qty: 90 TABLET | Refills: 2 | Status: SHIPPED | OUTPATIENT
Start: 2021-01-12 | End: 2021-09-03 | Stop reason: SDUPTHER

## 2021-01-12 RX ORDER — FAMOTIDINE 40 MG/1
40 TABLET, FILM COATED ORAL
Qty: 180 TABLET | Refills: 3 | Status: SHIPPED | OUTPATIENT
Start: 2021-01-12 | End: 2021-09-03 | Stop reason: SDUPTHER

## 2021-01-12 NOTE — PROGRESS NOTES
[facility]  Dr. Veronica Mars  [address], [address]  [place] [address]  Phone: [phone]  Fax: [phone]      Patient ID: Bev Martinez                              : 1945    Visit Date: 2021    Chief Complaint: Hyperlipidemia (pt here for f/u and needs refills of medicaton ) and Foot Pain (pt states that she is having pain in her right heal )      Patient ID: Bev Martinez is a 75 y.o. female.    HPI  Here for 6 month follow up  Back at the [facility] just recently as gym closed down before the holidays  - wants to get up from 40 to 76 lengths of the pool - there 3 times a week  Having foot pain  Sees foot doctor Jia Diehl- plans to see her soon  Winter tough on her allergies  Taking Clarinex regularly    No skin lesions noted- sees dermatologist regularly  Needs to take medication for reflux - not PPI  Last lab work 2020 - due  Needs prescriptions    Hyperlipidemia  This is a chronic problem. The current episode started more than 1 year ago. The problem is controlled. Recent lipid tests were reviewed and are normal. She has no history of chronic renal disease, diabetes, hypothyroidism, liver disease, obesity or nephrotic syndrome. There are no known factors aggravating her hyperlipidemia. Pertinent negatives include no chest pain, focal weakness or shortness of breath. Current antihyperlipidemic treatment includes statins. The current treatment provides significant improvement of lipids. There are no compliance problems.  Risk factors for coronary artery disease include dyslipidemia and post-menopausal.   Palpitations   This is a chronic problem. The current episode started more than 1 year ago. Nothing aggravates the symptoms. Pertinent negatives include no anxiety, chest pain, dizziness, irregular heartbeat, near-syncope, shortness of breath or syncope. She has tried beta blockers for the symptoms. The treatment provided  "significant relief. There is no history of anxiety, heart disease, hyperthyroidism or a valve disorder.         Patient Active Problem List   Diagnosis   • Allergic rhinitis   • Calculus of kidney   • Esophagitis, reflux   • Fatty liver   • History of colon polyps   • Impaired fasting glucose   • Overweight   • Paroxysmal supraventricular tachycardia (CMS/HCC)   • Pure hypercholesterolemia   • Malignant melanoma (CMS/HCC)   • Left cataract   • Abnormal finding on urinalysis   • Herpes simplex   • Acute pain of both shoulders   • Paronychia of right thumb         Current Outpatient Medications:   •  aspirin 81 mg enteric coated tablet, Take 81 mg by mouth 2 (two) times a week (Mon, Thu). Monday and Thursday, Disp: , Rfl:   •  atorvastatin (LIPITOR) 10 mg tablet, Take 1 tablet (10 mg total) by mouth nightly., Disp: 90 tablet, Rfl: 2  •  desloratadine (CLARINEX) 5 mg tablet, Take 1 tablet (5 mg total) by mouth daily., Disp: 90 tablet, Rfl: 2  •  famotidine (PEPCID) 40 mg tablet, Take 1 tablet (40 mg total) by mouth 2 (two) times a day., Disp: 180 tablet, Rfl: 3  •  latanoprost (XALATAN) 0.005 % ophthalmic solution, Administer 1 drop into both eyes daily.  , Disp: , Rfl:   •  metoprolol succinate XL (TOPROL XL) 50 mg 24 hr tablet, Take 1 tablet (50 mg total) by mouth daily., Disp: 90 tablet, Rfl: 2  •  timolol (TIMOPTIC-XR) 0.5 % ophthalmic gel-forming, Administer 1 drop into both eyes daily.  , Disp: , Rfl:   •  valACYclovir (VALTREX) 500 mg tablet, Take 1 tablet (500 mg total) by mouth 2 (two) times a day., Disp: 60 tablet, Rfl: 2    Allergies   Allergen Reactions   • Adhesive Tape-Silicones Other (see comments)     \"Skin Burn\"   • Ambien [Zolpidem] Other (see comments)     Amnesia   • Dexilant [Dexlansoprazole] GI intolerance   • Sulfa (Sulfonamide Antibiotics) Rash       Social History     Tobacco Use   • Smoking status: Never Smoker   • Smokeless tobacco: Never Used   Substance Use Topics   • Alcohol use: No   • " Drug use: No       Health Maintenance   Topic Date Due   • Varicella Vaccines (1 of 2 - 2-dose childhood series) 01/31/1946   • Medicare Annual Wellness Visit  01/31/1963   • Pneumococcal (2 of 3 - PPSV23) 09/05/2016   • Pneumococcal (65 years and older) (2 of 2 - PPSV23) 07/11/2017   • Annual Falls Risk Screening  01/01/2021   • DEXA Scan  01/12/2022 (Originally 1945)   • Mammogram  05/06/2021   • Colonoscopy  11/11/2026   • DTaP, Tdap, and Td Vaccines (2 - Td) 12/03/2029   • Zoster Vaccine  Completed   • Influenza Vaccine  Completed   • Hepatitis C Screening  Completed   • Meningococcal ACWY  Aged Out   • HIB Vaccines  Aged Out   • IPV Vaccines  Aged Out   • HPV Vaccines  Aged Out       Past Medical History:   Diagnosis Date   • Allergic rhinitis    • Calculus of kidney     Spontaneously passed   • Cataracts, bilateral    • Cellulitis     right thumb    • Chronic pain     Back Pain   • Esophagitis, reflux    • Essential hypertension, benign    • Fatty liver    • Glaucoma    • H/O cardiovascular stress test    • History of colon polyps    • Impaired fasting glucose     Blood Glucose usually 115-124 in am   • Macular degeneration    • Malignant melanoma (CMS/HCC) 2010    Left Arm   • Migraines     Last migraine 30 years ago   • Overweight    • Paroxysmal supraventricular tachycardia (CMS/HCC)     Controlled with Toprol   • Prediabetes    • Pure hypercholesterolemia    • Skin cancer     Squamous cell Face   • Small bowel obstruction (CMS/HCC)     Caused by Scar tissue from Tubal Ligation   • UTI (urinary tract infection) 04/2018    Develped an SVT with UTI and followed by cardiology         The following have been reviewed and updated as appropriate in this visit:  Allergies  Meds  Problems         Review of System  Review of Systems   Constitutional: Unexpected weight change: but up a few lbs - since unable to be at the pool.   Respiratory: Negative for shortness of breath.    Cardiovascular: Negative for  "chest pain, palpitations, syncope and near-syncope.   Genitourinary: Negative for flank pain.   Neurological: Negative for dizziness and focal weakness.   Psychiatric/Behavioral: The patient is not nervous/anxious.        Objective     Vitals  Vitals:    01/12/21 0910   BP: 127/64   BP Location: Left upper arm   Patient Position: Sitting   Pulse: 67   Resp: 16   Temp: (!) 35.6 °C (96 °F)   TempSrc: Temporal   SpO2: 98%   Weight: 82.1 kg (181 lb)   Height: 1.651 m (5' 5\")       Body mass index is 30.12 kg/m².    Physical Exam  Physical Exam  Vitals signs reviewed.   Neck:      Musculoskeletal: Neck supple.      Thyroid: No thyromegaly.   Cardiovascular:      Rate and Rhythm: Normal rate and regular rhythm.      Heart sounds: Normal heart sounds. No murmur. No gallop.    Pulmonary:      Effort: Pulmonary effort is normal.      Breath sounds: Normal breath sounds.   Abdominal:      General: Bowel sounds are normal.   Skin:     General: Skin is warm and dry.   Neurological:      Mental Status: She is oriented to person, place, and time.      Gait: Gait normal.         Assessment/Plan     Problem List Items Addressed This Visit        Respiratory    Allergic rhinitis     Stable  Continue daily Clarinex in winter         Relevant Medications    desloratadine (CLARINEX) 5 mg tablet       Circulatory    Paroxysmal supraventricular tachycardia (CMS/HCC)     Stable  Continue current medication   Follow up with cardiologist as requested by this specialist         Relevant Medications    atorvastatin (LIPITOR) 10 mg tablet    metoprolol succinate XL (TOPROL XL) 50 mg 24 hr tablet    Other Relevant Orders    Comprehensive metabolic panel       Digestive    Esophagitis, reflux     Stable   Continue H2 blocker  Decrease weight - may improve symptoms         Relevant Medications    famotidine (PEPCID) 40 mg tablet    Other Relevant Orders    CBC and differential       Genitourinary    Calculus of kidney     No recent episodes  Stay " well hydrated  Check related labs           Relevant Orders    Urinalysis with Reflex Culture       Endocrine/Metabolic    Impaired fasting glucose - Primary     Last check FBS elevated   A1c 6.1  Decrease weight  Continue with increase in exercise level  Adhere to low carb diet         Relevant Orders    Hemoglobin A1c    Pure hypercholesterolemia     Okay LDL last check - 103  Adhere to low fat diet  Continue current statin dose  Check lipids and LFT's with next labs         Relevant Medications    atorvastatin (LIPITOR) 10 mg tablet    Other Relevant Orders    Comprehensive metabolic panel    Lipid panel       Other    Malignant melanoma (CMS/HCC)     FRANCISCO J  Management per dermatologist - patient UTD with follow up               Return in about 6 months (around 7/12/2021) for RACHEL.      Veronica Mars MD  1/16/2021

## 2021-01-16 ASSESSMENT — ENCOUNTER SYMPTOMS
FOCAL WEAKNESS: 0
DIZZINESS: 0
SYNCOPE: 0
IRREGULAR HEARTBEAT: 0
PALPITATIONS: 0
FLANK PAIN: 0
NERVOUS/ANXIOUS: 0
SHORTNESS OF BREATH: 0
NEAR-SYNCOPE: 0

## 2021-01-16 NOTE — ASSESSMENT & PLAN NOTE
Okay LDL last check - 103  Adhere to low fat diet  Continue current statin dose  Check lipids and LFT's with next labs

## 2021-01-16 NOTE — ASSESSMENT & PLAN NOTE
Last check FBS elevated   A1c 6.1  Decrease weight  Continue with increase in exercise level  Adhere to low carb diet

## 2021-02-23 ENCOUNTER — IMMUNIZATION (OUTPATIENT)
Dept: IMMUNIZATION | Facility: CLINIC | Age: 76
End: 2021-02-23

## 2021-03-15 ENCOUNTER — APPOINTMENT (OUTPATIENT)
Dept: URBAN - METROPOLITAN AREA CLINIC 200 | Age: 76
Setting detail: DERMATOLOGY
End: 2021-03-28

## 2021-03-15 DIAGNOSIS — Z85.820 PERSONAL HISTORY OF MALIGNANT MELANOMA OF SKIN: ICD-10-CM

## 2021-03-15 DIAGNOSIS — L57.8 OTHER SKIN CHANGES DUE TO CHRONIC EXPOSURE TO NONIONIZING RADIATION: ICD-10-CM

## 2021-03-15 DIAGNOSIS — L82.1 OTHER SEBORRHEIC KERATOSIS: ICD-10-CM

## 2021-03-15 PROBLEM — E78.5 HYPERLIPIDEMIA, UNSPECIFIED: Status: ACTIVE | Noted: 2021-03-15

## 2021-03-15 PROCEDURE — OTHER REASSURANCE: OTHER

## 2021-03-15 PROCEDURE — OTHER COUNSELING: OTHER

## 2021-03-15 PROCEDURE — OTHER MIPS QUALITY: OTHER

## 2021-03-15 PROCEDURE — 99212 OFFICE O/P EST SF 10 MIN: CPT

## 2021-03-15 ASSESSMENT — LOCATION DETAILED DESCRIPTION DERM
LOCATION DETAILED: RIGHT AXILLARY VAULT
LOCATION DETAILED: LEFT MEDIAL SUPERIOR CHEST

## 2021-03-15 ASSESSMENT — LOCATION ZONE DERM
LOCATION ZONE: AXILLAE
LOCATION ZONE: TRUNK

## 2021-03-15 ASSESSMENT — LOCATION SIMPLE DESCRIPTION DERM
LOCATION SIMPLE: CHEST
LOCATION SIMPLE: RIGHT AXILLARY VAULT

## 2021-03-15 NOTE — PROCEDURE: REASSURANCE
Additional Note: Cryo
Include Location In Plan?: Yes
Hide Additional Notes?: No
Detail Level: Detailed

## 2021-04-01 ENCOUNTER — IMMUNIZATION (OUTPATIENT)
Dept: IMMUNIZATION | Facility: CLINIC | Age: 76
End: 2021-04-01
Attending: FAMILY MEDICINE

## 2021-09-03 ENCOUNTER — OFFICE VISIT (OUTPATIENT)
Dept: PRIMARY CARE | Facility: CLINIC | Age: 76
End: 2021-09-03
Payer: MEDICARE

## 2021-09-03 VITALS
BODY MASS INDEX: 28.96 KG/M2 | DIASTOLIC BLOOD PRESSURE: 82 MMHG | RESPIRATION RATE: 18 BRPM | SYSTOLIC BLOOD PRESSURE: 134 MMHG | HEIGHT: 65 IN | TEMPERATURE: 96 F | HEART RATE: 89 BPM | WEIGHT: 173.8 LBS | OXYGEN SATURATION: 98 %

## 2021-09-03 DIAGNOSIS — K21.00 ESOPHAGITIS, REFLUX: ICD-10-CM

## 2021-09-03 DIAGNOSIS — B00.9 HERPES SIMPLEX: ICD-10-CM

## 2021-09-03 DIAGNOSIS — E78.00 PURE HYPERCHOLESTEROLEMIA: Primary | ICD-10-CM

## 2021-09-03 DIAGNOSIS — H35.353 CYSTOID MACULAR DEGENERATION OF BOTH EYES: ICD-10-CM

## 2021-09-03 DIAGNOSIS — R73.01 IMPAIRED FASTING GLUCOSE: ICD-10-CM

## 2021-09-03 DIAGNOSIS — I47.10 PAROXYSMAL SUPRAVENTRICULAR TACHYCARDIA (CMS/HCC): ICD-10-CM

## 2021-09-03 DIAGNOSIS — J30.9 ALLERGIC RHINITIS, UNSPECIFIED SEASONALITY, UNSPECIFIED TRIGGER: ICD-10-CM

## 2021-09-03 DIAGNOSIS — C43.9 MALIGNANT MELANOMA, UNSPECIFIED SITE (CMS/HCC): ICD-10-CM

## 2021-09-03 DIAGNOSIS — K21.00 GASTROESOPHAGEAL REFLUX DISEASE WITH ESOPHAGITIS WITHOUT HEMORRHAGE: ICD-10-CM

## 2021-09-03 DIAGNOSIS — Z00.00 ENCOUNTER FOR MEDICARE ANNUAL WELLNESS EXAM: ICD-10-CM

## 2021-09-03 DIAGNOSIS — M19.91 PRIMARY OSTEOARTHRITIS, UNSPECIFIED SITE: ICD-10-CM

## 2021-09-03 PROBLEM — L03.011 PARONYCHIA OF RIGHT THUMB: Status: RESOLVED | Noted: 2020-07-30 | Resolved: 2021-09-03

## 2021-09-03 PROBLEM — R82.90 ABNORMAL FINDING ON URINALYSIS: Status: RESOLVED | Noted: 2019-05-26 | Resolved: 2021-09-03

## 2021-09-03 PROCEDURE — G0439 PPPS, SUBSEQ VISIT: HCPCS | Performed by: INTERNAL MEDICINE

## 2021-09-03 RX ORDER — DESLORATADINE 5 MG/1
5 TABLET ORAL DAILY
Qty: 90 TABLET | Refills: 3 | Status: SHIPPED | OUTPATIENT
Start: 2021-09-03 | End: 2022-02-18 | Stop reason: SDUPTHER

## 2021-09-03 RX ORDER — FAMOTIDINE 40 MG/1
40 TABLET, FILM COATED ORAL
Qty: 180 TABLET | Refills: 3 | Status: SHIPPED | OUTPATIENT
Start: 2021-09-03 | End: 2022-03-03 | Stop reason: SDUPTHER

## 2021-09-03 RX ORDER — ATORVASTATIN CALCIUM 10 MG/1
10 TABLET, FILM COATED ORAL NIGHTLY
Qty: 90 TABLET | Refills: 3 | Status: SHIPPED | OUTPATIENT
Start: 2021-09-03 | End: 2022-02-18 | Stop reason: SDUPTHER

## 2021-09-03 RX ORDER — METOPROLOL SUCCINATE 50 MG/1
50 TABLET, EXTENDED RELEASE ORAL DAILY
Qty: 90 TABLET | Refills: 3 | Status: SHIPPED | OUTPATIENT
Start: 2021-09-03 | End: 2022-02-18 | Stop reason: SDUPTHER

## 2021-09-03 ASSESSMENT — ENCOUNTER SYMPTOMS
ACTIVITY CHANGE: 0
NERVOUS/ANXIOUS: 0
COUGH: 0
DYSPHORIC MOOD: 0
HEMATURIA: 0
FEVER: 0
FATIGUE: 1
BACK PAIN: 0
BLOOD IN STOOL: 0
WHEEZING: 0
ABDOMINAL PAIN: 0
ARTHRALGIAS: 1
APPETITE CHANGE: 0
HEADACHES: 0
PALPITATIONS: 0
SLEEP DISTURBANCE: 1
SHORTNESS OF BREATH: 0
LIGHT-HEADEDNESS: 0
DIZZINESS: 0

## 2021-09-03 ASSESSMENT — PATIENT HEALTH QUESTIONNAIRE - PHQ9: SUM OF ALL RESPONSES TO PHQ9 QUESTIONS 1 & 2: 0

## 2021-09-03 ASSESSMENT — MINI COG
TOTAL SCORE: 5
COMPLETED: YES

## 2021-09-03 NOTE — ASSESSMENT & PLAN NOTE
BG higher.   HbA1c 5.9-- has been in this range.   Is on low carbs.   Exercises regularly- 4-5/week.

## 2021-09-03 NOTE — PROGRESS NOTES
[facility] [place]  Dr. Maricruz Arrieta  [address], [address]  [place] [address]  Phone: [phone]  Fax: [phone]          Patient ID: Bev Martinez                              : 1945    Visit Date: 9/3/2021    Chief Complaint: Medicare Subsequent Annual Wellness Visit      Subjective     Bev Martinez is a 76 y.o. female who presents for a Welcome to Medicare exam.     MAW--    Pt is retired RN, [employer].    Swims regularly.  Joints do better with water therapy, swimming.     I have personally reviewed the laboratory reports: CBC, CMP, cholesterol panel.   Discussed with patient.     Had mammogram.   Pt would prefer not to do Dexa. Will not take med.         Chief Complaint   Patient presents with   • Medicare Subsequent Annual Wellness Visit       Comprehensive Medical and Social History  Patient Active Problem List   Diagnosis   • Allergic rhinitis   • Calculus of kidney   • Esophagitis, reflux   • Fatty liver   • History of colon polyps   • Impaired fasting glucose   • Overweight   • Paroxysmal supraventricular tachycardia (CMS/HCC)   • Pure hypercholesterolemia   • Malignant melanoma (CMS/HCC)   • Left cataract   • Herpes simplex   • Acute pain of both shoulders   • Encounter for Medicare annual wellness exam   • Cystoid macular degeneration of both eyes   • Primary osteoarthritis     Past Medical History:   Diagnosis Date   • Allergic rhinitis    • Calculus of kidney     Spontaneously passed   • Cataracts, bilateral    • Cellulitis     right thumb    • Chronic pain     Back Pain   • Esophagitis, reflux    • Essential hypertension, benign    • Fatty liver    • Glaucoma    • H/O cardiovascular stress test    • History of colon polyps    • Impaired fasting glucose     Blood Glucose usually 115-124 in am   • Macular degeneration    • Malignant melanoma (CMS/HCC) 2010    Left Arm   • Migraines     Last migraine 30 years ago   • Overweight    • Paroxysmal  "supraventricular tachycardia (CMS/HCC)     Controlled with Toprol   • Prediabetes    • Pure hypercholesterolemia    • Skin cancer     Squamous cell Face   • Small bowel obstruction (CMS/HCC)     Caused by Scar tissue from Tubal Ligation   • UTI (urinary tract infection) 04/2018    Develped an SVT with UTI and followed by cardiology     Past Surgical History:   Procedure Laterality Date   • ABDOMINAL SURGERY  07/2012    Small Bowel Resection   • CATARACT EXTRACTION Right 01/2016   • KNEE CARTILAGE SURGERY Left 11/2008   • SKIN CANCER EXCISION Left 2009    Melanoma   • TUBAL LIGATION       Allergies   Allergen Reactions   • Adhesive Tape-Silicones Other (see comments)     \"Skin Burn\"   • Ambien [Zolpidem] Other (see comments)     Amnesia   • Dexilant [Dexlansoprazole] GI intolerance   • Sulfa (Sulfonamide Antibiotics) Rash     Current Outpatient Medications   Medication Sig Dispense Refill   • aspirin 81 mg enteric coated tablet Take 81 mg by mouth 2 (two) times a week (Mon, Thu). Monday and Thursday     • desloratadine (CLARINEX) 5 mg tablet Take 1 tablet (5 mg total) by mouth daily. 90 tablet 3   • famotidine (PEPCID) 40 mg tablet Take 1 tablet (40 mg total) by mouth 2 (two) times a day. 180 tablet 3   • latanoprost (XALATAN) 0.005 % ophthalmic solution Administer 1 drop into both eyes daily.       • metoprolol succinate XL (TOPROL XL) 50 mg 24 hr tablet Take 1 tablet (50 mg total) by mouth daily. 90 tablet 3   • timolol (TIMOPTIC-XR) 0.5 % ophthalmic gel-forming Administer 1 drop into both eyes daily.       • atorvastatin (LIPITOR) 10 mg tablet Take 1 tablet (10 mg total) by mouth nightly. 90 tablet 3   • valACYclovir (VALTREX) 500 mg tablet Take 1 tablet (500 mg total) by mouth 2 (two) times a day. 60 tablet 2     No current facility-administered medications for this visit.     Family History   Problem Relation Age of Onset   • Heart disease Biological Mother    • COPD Biological Father    • Glaucoma Biological " "Sister    • Heart disease Biological Brother    • Diabetes Biological Brother    • Glaucoma Biological Brother    • Stroke Maternal Grandfather    • Glaucoma Biological Sister    • Eating disorder Biological Sister    • Glaucoma Biological Brother      Social History     Tobacco Use   • Smoking status: Never Smoker   • Smokeless tobacco: Never Used   Substance Use Topics   • Alcohol use: No   • Drug use: No       Objective   Vitals  Vitals:    09/03/21 1030   BP: 134/82   BP Location: Left upper arm   Patient Position: Sitting   Pulse: 89   Resp: 18   Temp: (!) 35.6 °C (96 °F)   SpO2: 98%   Weight: 78.8 kg (173 lb 12.8 oz)   Height: 1.651 m (5' 5\")     Body mass index is 28.92 kg/m².    Review of Systems   Constitutional: Positive for fatigue. Negative for activity change, appetite change and fever.   HENT: Positive for dental problem. Negative for hearing loss.    Eyes: Positive for visual disturbance.   Respiratory: Negative for cough, shortness of breath (MANRIQUEZ) and wheezing.    Cardiovascular: Negative for chest pain, palpitations and leg swelling.   Gastrointestinal: Negative for abdominal pain and blood in stool.   Genitourinary: Negative for hematuria.   Musculoskeletal: Positive for arthralgias. Negative for back pain.   Neurological: Negative for dizziness, light-headedness and headaches.   Psychiatric/Behavioral: Positive for sleep disturbance. Negative for dysphoric mood. The patient is not nervous/anxious.        Physical Exam  Vitals reviewed.   Constitutional:       General: She is not in acute distress.     Appearance: She is well-developed. She is not diaphoretic.   HENT:      Head: Normocephalic and atraumatic.      Right Ear: External ear normal.      Left Ear: External ear normal.      Nose: Nose normal.   Eyes:      General: No scleral icterus.        Right eye: No discharge.         Left eye: No discharge.      Conjunctiva/sclera: Conjunctivae normal.      Pupils: Pupils are equal, round, and " reactive to light.   Neck:      Thyroid: No thyromegaly.      Vascular: No JVD.      Trachea: No tracheal deviation.   Cardiovascular:      Rate and Rhythm: Normal rate and regular rhythm.      Heart sounds: Normal heart sounds. No murmur heard.   No friction rub. No gallop.    Pulmonary:      Effort: Pulmonary effort is normal. No respiratory distress.      Breath sounds: Normal breath sounds. No wheezing or rales.   Chest:      Chest wall: No tenderness.   Abdominal:      General: Bowel sounds are normal. There is no distension.      Palpations: Abdomen is soft. There is no mass.      Tenderness: There is no abdominal tenderness. There is no guarding or rebound.      Hernia: No hernia is present.   Musculoskeletal:         General: No tenderness or deformity. Normal range of motion.      Cervical back: Normal range of motion and neck supple.   Lymphadenopathy:      Cervical: No cervical adenopathy.   Skin:     General: Skin is warm and dry.      Coloration: Skin is not pale.      Findings: No erythema or rash.   Neurological:      Mental Status: She is alert and oriented to person, place, and time.      Cranial Nerves: No cranial nerve deficit.      Sensory: No sensory deficit.      Motor: No abnormal muscle tone.      Coordination: Coordination normal.      Deep Tendon Reflexes: Reflexes normal.   Psychiatric:         Behavior: Behavior normal.         Thought Content: Thought content normal.         Judgment: Judgment normal.         Advanced Care Plan  Does patient have advance directive?: Yes       Patient has Advance Directive: Patient has Advance Directive, has not brought in   Does patient have current OOH DNR form?: Yes       Patient has current OOH DNR form: Patient has OOH DNR, has not brought in   Does patient have current POLST?: No   Patient does not have current POLST: Patient/Family declines further information         PHQ    PHQ 2 to 9:  Will the patient answer the depression questions?:  Y    Little interest or pleasure in doing things: Not at all    Feeling down, depressed, or hopeless: Not at all    Depression Risk: 0    No data recorded  No data recorded  No data recorded  No data recorded  No data recorded  No data recorded  No data recorded  No data recorded  No data recorded  No data recorded                                                Mini Cog  Completed: Yes  Score: 5  Result: Negative    Get Up and Go  Result: Pass      STEADI Falls Risk  One or more falls in the last year: No           Has trouble stepping up onto a curb: No   Advised to use a cane or walker to get around safely: No   Often has to rush to the toilet: No   Feels unsteady when walking: No   Has lost some feeling in feet: No   Often feels sad or depressed: No   Steadies self on furniture while walking at home: No   Takes medication that makes him/her feel lightheaded or more tired than usual: No   Worried about falling: No   Takes medicine to sleep or improve mood: No       Falls screen completed: Yes       Hearing and Vision Screening   Hearing Screening    125Hz 250Hz 500Hz 1000Hz 2000Hz 3000Hz 4000Hz 6000Hz 8000Hz   Right ear:            Left ear:            Comments: N/a    Vision Screening Comments: Every 6months       Assessment/Plan     Problem List Items Addressed This Visit        Unprioritized    Allergic rhinitis    Current Assessment & Plan     On clarinex.          Relevant Medications    desloratadine (CLARINEX) 5 mg tablet    Esophagitis, reflux    Current Assessment & Plan     Stable  Did not tolerate other meds. On pepcid.   Continue current medication            Relevant Medications    famotidine (PEPCID) 40 mg tablet    Impaired fasting glucose    Current Assessment & Plan     BG higher.   HbA1c 5.9-- has been in this range.   Is on low carbs.   Exercises regularly- 4-5/week.          Paroxysmal supraventricular tachycardia (CMS/HCC)    Current Assessment & Plan     On metoprolol.   Was dx when pt was in  50s.          Relevant Medications    metoprolol succinate XL (TOPROL XL) 50 mg 24 hr tablet    atorvastatin (LIPITOR) 10 mg tablet    Pure hypercholesterolemia - Primary    Current Assessment & Plan     On Atorvastatin.   Tolerating medication well with no side effects.   Chol all WNL-  HDL slightly low.            Relevant Medications    atorvastatin (LIPITOR) 10 mg tablet    Malignant melanoma (CMS/HCC)    Overview     Sees Dr Land.          Current Assessment & Plan     Has been stable.          Herpes simplex    Current Assessment & Plan     Recurrent.   uses PRN Valtrex.          Encounter for Medicare annual wellness exam    Current Assessment & Plan     Medicare wellness.   Reviewed PMH, medications, family history, social history.   FH father emphysema.  Mother CAD in 80s.  Brother mild dementia.   Ext family with DM, no cancers.   Reviewed vaccinations.  COVID UTD.  Shingles, pneumonia UTD.  Discussed health plan, prevention of falls, importance of maintaining strength with regular exercise and training; and healthy diet.             Cystoid macular degeneration of both eyes    Overview     Nichole Guerrero.         Current Assessment & Plan     Cysts in retina, macular degen.          Primary osteoarthritis    Current Assessment & Plan     No issues.   Controlled with water therapy, PRN Advil.                See Patient Instructions (the written plan) which was given to the patient for PPPS and health risk factors with interventions.     No data    Return in about 1 year (around 9/3/2022).        Maricruz Arrieta MD  9/3/2021

## 2021-09-03 NOTE — ASSESSMENT & PLAN NOTE
On Atorvastatin.   Tolerating medication well with no side effects.   Chol all WNL-  HDL slightly low.

## 2021-09-03 NOTE — ASSESSMENT & PLAN NOTE
Medicare wellness.   Reviewed PMH, medications, family history, social history.   FH father emphysema.  Mother CAD in 80s.  Brother mild dementia.   Ext family with DM, no cancers.   Reviewed vaccinations.  COVID UTD.  Shingles, pneumonia UTD.  Discussed health plan, prevention of falls, importance of maintaining strength with regular exercise and training; and healthy diet.

## 2021-09-13 ENCOUNTER — APPOINTMENT (OUTPATIENT)
Dept: URBAN - METROPOLITAN AREA CLINIC 200 | Age: 76
Setting detail: DERMATOLOGY
End: 2021-09-13

## 2021-09-13 DIAGNOSIS — Z85.828 PERSONAL HISTORY OF OTHER MALIGNANT NEOPLASM OF SKIN: ICD-10-CM

## 2021-09-13 DIAGNOSIS — Z85.820 PERSONAL HISTORY OF MALIGNANT MELANOMA OF SKIN: ICD-10-CM

## 2021-09-13 DIAGNOSIS — L57.8 OTHER SKIN CHANGES DUE TO CHRONIC EXPOSURE TO NONIONIZING RADIATION: ICD-10-CM

## 2021-09-13 DIAGNOSIS — T07XXXA INSECT BITE, NONVENOMOUS, OF OTHER, MULTIPLE, AND UNSPECIFIED SITES, WITHOUT MENTION OF INFECTION: ICD-10-CM

## 2021-09-13 DIAGNOSIS — B07.0 PLANTAR WART: ICD-10-CM

## 2021-09-13 DIAGNOSIS — Z11.52 ENCOUNTER FOR SCREENING FOR COVID-19: ICD-10-CM

## 2021-09-13 PROBLEM — E78.5 HYPERLIPIDEMIA, UNSPECIFIED: Status: ACTIVE | Noted: 2021-09-13

## 2021-09-13 PROBLEM — J30.1 ALLERGIC RHINITIS DUE TO POLLEN: Status: ACTIVE | Noted: 2021-09-13

## 2021-09-13 PROBLEM — S40.861A INSECT BITE (NONVENOMOUS) OF RIGHT UPPER ARM, INITIAL ENCOUNTER: Status: ACTIVE | Noted: 2021-09-13

## 2021-09-13 PROCEDURE — OTHER COUNSELING: OTHER

## 2021-09-13 PROCEDURE — OTHER SCREENING FOR COVID-19: OTHER

## 2021-09-13 PROCEDURE — OTHER REASSURANCE: OTHER

## 2021-09-13 PROCEDURE — 99213 OFFICE O/P EST LOW 20 MIN: CPT

## 2021-09-13 PROCEDURE — OTHER SUNSCREEN RECOMMENDATIONS: OTHER

## 2021-09-13 ASSESSMENT — LOCATION DETAILED DESCRIPTION DERM
LOCATION DETAILED: RIGHT DISTAL POSTERIOR UPPER ARM
LOCATION DETAILED: PERIUMBILICAL SKIN
LOCATION DETAILED: NASAL SUPRATIP
LOCATION DETAILED: RIGHT PLANTAR FOREFOOT OVERLYING 3RD METATARSAL
LOCATION DETAILED: LEFT MEDIAL SUPERIOR CHEST
LOCATION DETAILED: RIGHT ANTECUBITAL SKIN

## 2021-09-13 ASSESSMENT — LOCATION ZONE DERM
LOCATION ZONE: ARM
LOCATION ZONE: FEET
LOCATION ZONE: TRUNK
LOCATION ZONE: NOSE

## 2021-09-13 ASSESSMENT — LOCATION SIMPLE DESCRIPTION DERM
LOCATION SIMPLE: RIGHT POSTERIOR UPPER ARM
LOCATION SIMPLE: CHEST
LOCATION SIMPLE: ABDOMEN
LOCATION SIMPLE: RIGHT UPPER ARM
LOCATION SIMPLE: RIGHT PLANTAR SURFACE
LOCATION SIMPLE: NOSE

## 2021-09-21 ENCOUNTER — OFFICE VISIT (OUTPATIENT)
Dept: PRIMARY CARE | Facility: CLINIC | Age: 76
End: 2021-09-21
Payer: MEDICARE

## 2021-09-21 VITALS
OXYGEN SATURATION: 96 % | SYSTOLIC BLOOD PRESSURE: 140 MMHG | HEIGHT: 65 IN | HEART RATE: 76 BPM | DIASTOLIC BLOOD PRESSURE: 76 MMHG | BODY MASS INDEX: 28.99 KG/M2 | WEIGHT: 174 LBS

## 2021-09-21 DIAGNOSIS — S76.011A HIP STRAIN, RIGHT, INITIAL ENCOUNTER: Primary | ICD-10-CM

## 2021-09-21 PROCEDURE — 99213 OFFICE O/P EST LOW 20 MIN: CPT | Performed by: NURSE PRACTITIONER

## 2021-09-21 RX ORDER — CYCLOBENZAPRINE HCL 10 MG
10 TABLET ORAL NIGHTLY PRN
Qty: 30 TABLET | Refills: 0 | Status: SHIPPED | OUTPATIENT
Start: 2021-09-21 | End: 2021-10-18 | Stop reason: SDUPTHER

## 2021-09-21 ASSESSMENT — ENCOUNTER SYMPTOMS
HIP PAIN: 1
LOSS OF MOTION: 0
WEAKNESS: 0
LOSS OF SENSATION: 0
DIAPHORESIS: 0
FATIGUE: 0
BACK PAIN: 0
CHILLS: 0
TINGLING: 0
ARTHRALGIAS: 1
FEVER: 0
NUMBNESS: 0
INABILITY TO BEAR WEIGHT: 0
MUSCLE WEAKNESS: 0
JOINT SWELLING: 0

## 2021-09-21 NOTE — PROGRESS NOTES
[facility] at [address]  [place] [address]  [phone]  Fax [phone]      Patient ID: Bev Martinez                              : 1945    Visit Date: 2021    Chief Complaint: Hip Pain (right hip)         Patient ID: Bev Martinez is a 76 y.o. female.    Patient Active Problem List   Diagnosis   • Allergic rhinitis   • Calculus of kidney   • Esophagitis, reflux   • Fatty liver   • History of colon polyps   • Impaired fasting glucose   • Overweight   • Paroxysmal supraventricular tachycardia (CMS/HCC)   • Pure hypercholesterolemia   • Malignant melanoma (CMS/HCC)   • Left cataract   • Herpes simplex   • Acute pain of both shoulders   • Encounter for Medicare annual wellness exam   • Cystoid macular degeneration of both eyes   • Primary osteoarthritis   • Hip strain, right, initial encounter         Current Outpatient Medications:   •  aspirin 81 mg enteric coated tablet, Take 81 mg by mouth 2 (two) times a week (Mon, Thu). Monday and Thursday, Disp: , Rfl:   •  atorvastatin (LIPITOR) 10 mg tablet, Take 1 tablet (10 mg total) by mouth nightly., Disp: 90 tablet, Rfl: 3  •  desloratadine (CLARINEX) 5 mg tablet, Take 1 tablet (5 mg total) by mouth daily., Disp: 90 tablet, Rfl: 3  •  famotidine (PEPCID) 40 mg tablet, Take 1 tablet (40 mg total) by mouth 2 (two) times a day., Disp: 180 tablet, Rfl: 3  •  latanoprost (XALATAN) 0.005 % ophthalmic solution, Administer 1 drop into both eyes daily.  , Disp: , Rfl:   •  metoprolol succinate XL (TOPROL XL) 50 mg 24 hr tablet, Take 1 tablet (50 mg total) by mouth daily., Disp: 90 tablet, Rfl: 3  •  timolol (TIMOPTIC-XR) 0.5 % ophthalmic gel-forming, Administer 1 drop into both eyes daily.  , Disp: , Rfl:   •  cyclobenzaprine (FLEXERIL) 10 mg tablet, Take 1 tablet (10 mg total) by mouth nightly as needed for muscle spasms., Disp: 30 tablet, Rfl: 0  •  valACYclovir (VALTREX) 500 mg tablet,  "Take 1 tablet (500 mg total) by mouth 2 (two) times a day. (Patient not taking: Reported on 9/21/2021 ), Disp: 60 tablet, Rfl: 2    Allergies   Allergen Reactions   • Adhesive Tape-Silicones Other (see comments)     \"Skin Burn\"   • Ambien [Zolpidem] Other (see comments)     Amnesia   • Dexilant [Dexlansoprazole] GI intolerance   • Sulfa (Sulfonamide Antibiotics) Rash       Social History     Tobacco Use   • Smoking status: Never Smoker   • Smokeless tobacco: Never Used   Substance Use Topics   • Alcohol use: No   • Drug use: No       Health Maintenance   Topic Date Due   • Pneumococcal (2 of 4 - PPSV23) 09/05/2016   • Pneumococcal (65 years and older) (2 of 4 - PPSV23) 09/05/2016   • DEXA Scan  01/12/2022 (Originally 1945)   • Medicare Annual Wellness Visit  09/03/2022   • DTaP, Tdap, and Td Vaccines (2 - Td or Tdap) 12/03/2029   • Annual Falls Risk Screening  Completed   • Zoster Vaccine  Completed   • Influenza Vaccine  Completed   • Hepatitis C Screening  Completed   • COVID-19 Vaccine  Completed   • Meningococcal ACWY  Aged Out   • HIB Vaccines  Aged Out   • IPV Vaccines  Aged Out   • HPV Vaccines  Aged Out       HPI  Hip Pain   The incident occurred more than 1 week ago. The incident occurred at the pool. The injury mechanism was a twisting injury. The pain is present in the right hip. The quality of the pain is described as aching, shooting and stabbing. The pain is moderate. The pain has been fluctuating since onset. Pertinent negatives include no inability to bear weight, loss of motion, loss of sensation, muscle weakness, numbness or tingling. The symptoms are aggravated by movement and weight bearing. She has tried NSAIDs for the symptoms. The treatment provided mild relief.       The following have been reviewed and updated as appropriate in this visit:         Review of System  Review of Systems   Constitutional: Negative for chills, diaphoresis, fatigue and fever.   Musculoskeletal: Positive for " "arthralgias. Negative for back pain, gait problem and joint swelling.        R hip   Neurological: Negative for tingling, weakness and numbness.       Objective     Vitals  Vitals:    09/21/21 1059   BP: 140/76   Pulse: 76   SpO2: 96%   Weight: 78.9 kg (174 lb)   Height: 1.651 m (5' 5\")     Body mass index is 28.96 kg/m².    Physical Exam  Physical Exam  Vitals reviewed.   Constitutional:       General: She is not in acute distress.     Appearance: Normal appearance. She is not diaphoretic.   Cardiovascular:      Rate and Rhythm: Normal rate and regular rhythm.   Pulmonary:      Effort: Pulmonary effort is normal. No respiratory distress.   Musculoskeletal:      Right hip: No deformity, lacerations, tenderness, bony tenderness or crepitus. Decreased range of motion. Normal strength.      Comments: Pain with abduction/adduction.    Neurological:      Mental Status: She is alert and oriented to person, place, and time.         Assessment/Plan     Problem List Items Addressed This Visit     Hip strain, right, initial encounter - Primary     PT eval and treat 12 visits  Ibuprofen PRN  Cyclobenzaprine HS PRN  Follow up if symptoms worsen/persist.  Ortho consult if needed.         Relevant Medications    cyclobenzaprine (FLEXERIL) 10 mg tablet    Other Relevant Orders    Ambulatory referral to Physical Therapy              MARIANNE Gandhi  9/21/2021  "

## 2021-09-21 NOTE — ASSESSMENT & PLAN NOTE
PT eval and treat 12 visits  Ibuprofen PRN  Cyclobenzaprine HS PRN  Follow up if symptoms worsen/persist.  Ortho consult if needed.

## 2021-10-05 ENCOUNTER — APPOINTMENT (OUTPATIENT)
Dept: URBAN - METROPOLITAN AREA CLINIC 200 | Age: 76
Setting detail: DERMATOLOGY
End: 2021-10-07

## 2021-10-05 DIAGNOSIS — Z11.52 ENCOUNTER FOR SCREENING FOR COVID-19: ICD-10-CM

## 2021-10-05 DIAGNOSIS — L57.0 ACTINIC KERATOSIS: ICD-10-CM

## 2021-10-05 PROBLEM — J30.1 ALLERGIC RHINITIS DUE TO POLLEN: Status: ACTIVE | Noted: 2021-10-05

## 2021-10-05 PROBLEM — Z85.820 PERSONAL HISTORY OF MALIGNANT MELANOMA OF SKIN: Status: ACTIVE | Noted: 2021-10-05

## 2021-10-05 PROCEDURE — 17003 DESTRUCT PREMALG LES 2-14: CPT

## 2021-10-05 PROCEDURE — OTHER LIQUID NITROGEN: OTHER

## 2021-10-05 PROCEDURE — OTHER SCREENING FOR COVID-19: OTHER

## 2021-10-05 PROCEDURE — 17000 DESTRUCT PREMALG LESION: CPT

## 2021-10-05 ASSESSMENT — LOCATION SIMPLE DESCRIPTION DERM
LOCATION SIMPLE: ABDOMEN
LOCATION SIMPLE: NOSE
LOCATION SIMPLE: RIGHT LIP

## 2021-10-05 ASSESSMENT — LOCATION ZONE DERM
LOCATION ZONE: TRUNK
LOCATION ZONE: NOSE
LOCATION ZONE: LIP

## 2021-10-05 ASSESSMENT — PAIN INTENSITY VAS: HOW INTENSE IS YOUR PAIN 0 BEING NO PAIN, 10 BEING THE MOST SEVERE PAIN POSSIBLE?: NO PAIN

## 2021-10-05 ASSESSMENT — LOCATION DETAILED DESCRIPTION DERM
LOCATION DETAILED: RIGHT UPPER CUTANEOUS LIP
LOCATION DETAILED: PERIUMBILICAL SKIN
LOCATION DETAILED: NASAL DORSUM

## 2021-10-05 NOTE — PROCEDURE: LIQUID NITROGEN
Number Of Freeze-Thaw Cycles: 1 freeze-thaw cycle
Render Note In Bullet Format When Appropriate: No
Duration Of Freeze Thaw-Cycle (Seconds): 2
Show Aperture Variable?: Yes
Consent: The patient's consent was obtained including but not limited to risks of crusting, scabbing, blistering, scarring, darker or lighter pigmentary change, recurrence, incomplete removal and infection.
Detail Level: Detailed
Post-Care Instructions: I reviewed with the patient in detail post-care instructions. Patient is to wear sunprotection, and avoid picking at any of the treated lesions. Pt may apply Vaseline to crusted or scabbing areas.

## 2021-10-18 DIAGNOSIS — S76.011A HIP STRAIN, RIGHT, INITIAL ENCOUNTER: ICD-10-CM

## 2021-10-18 RX ORDER — CYCLOBENZAPRINE HCL 10 MG
10 TABLET ORAL NIGHTLY PRN
Qty: 30 TABLET | Refills: 0 | Status: SHIPPED | OUTPATIENT
Start: 2021-10-18 | End: 2022-01-25 | Stop reason: ALTCHOICE

## 2021-10-18 NOTE — TELEPHONE ENCOUNTER
Medicine Refill Request    Last Office Visit: 9/21/2021  Last Telemedicine Visit: Visit date not found    Next Office Visit: 9/12/2022  Next Telemedicine Visit: Visit date not found         Current Outpatient Medications:   •  aspirin 81 mg enteric coated tablet, Take 81 mg by mouth 2 (two) times a week (Mon, Thu). Monday and Thursday, Disp: , Rfl:   •  atorvastatin (LIPITOR) 10 mg tablet, Take 1 tablet (10 mg total) by mouth nightly., Disp: 90 tablet, Rfl: 3  •  cyclobenzaprine (FLEXERIL) 10 mg tablet, Take 1 tablet (10 mg total) by mouth nightly as needed for muscle spasms., Disp: 30 tablet, Rfl: 0  •  desloratadine (CLARINEX) 5 mg tablet, Take 1 tablet (5 mg total) by mouth daily., Disp: 90 tablet, Rfl: 3  •  famotidine (PEPCID) 40 mg tablet, Take 1 tablet (40 mg total) by mouth 2 (two) times a day., Disp: 180 tablet, Rfl: 3  •  latanoprost (XALATAN) 0.005 % ophthalmic solution, Administer 1 drop into both eyes daily.  , Disp: , Rfl:   •  metoprolol succinate XL (TOPROL XL) 50 mg 24 hr tablet, Take 1 tablet (50 mg total) by mouth daily., Disp: 90 tablet, Rfl: 3  •  timolol (TIMOPTIC-XR) 0.5 % ophthalmic gel-forming, Administer 1 drop into both eyes daily.  , Disp: , Rfl:   •  valACYclovir (VALTREX) 500 mg tablet, Take 1 tablet (500 mg total) by mouth 2 (two) times a day. (Patient not taking: Reported on 9/21/2021 ), Disp: 60 tablet, Rfl: 2      BP Readings from Last 3 Encounters:   09/21/21 140/76   09/03/21 134/82   01/12/21 127/64       Recent Lab results:  No results found for: CHOL, No results found for: HDL, No results found for: LDLCALC, No results found for: TRIG     Lab Results   Component Value Date    GLUCOSE 122 (H) 11/05/2018   ,   Lab Results   Component Value Date    HGBA1C 5.9 08/11/2021         Lab Results   Component Value Date    CREATININE 0.9 11/05/2018       No results found for: TSH

## 2021-11-05 ENCOUNTER — APPOINTMENT (OUTPATIENT)
Dept: URBAN - METROPOLITAN AREA CLINIC 200 | Age: 76
Setting detail: DERMATOLOGY
End: 2021-11-08

## 2021-11-05 DIAGNOSIS — Z11.52 ENCOUNTER FOR SCREENING FOR COVID-19: ICD-10-CM

## 2021-11-05 DIAGNOSIS — L57.0 ACTINIC KERATOSIS: ICD-10-CM

## 2021-11-05 PROCEDURE — 17000 DESTRUCT PREMALG LESION: CPT

## 2021-11-05 PROCEDURE — OTHER MIPS QUALITY: OTHER

## 2021-11-05 PROCEDURE — OTHER SCREENING FOR COVID-19: OTHER

## 2021-11-05 PROCEDURE — OTHER LIQUID NITROGEN: OTHER

## 2021-11-05 ASSESSMENT — LOCATION DETAILED DESCRIPTION DERM
LOCATION DETAILED: NASAL DORSUM
LOCATION DETAILED: PERIUMBILICAL SKIN

## 2021-11-05 ASSESSMENT — LOCATION SIMPLE DESCRIPTION DERM
LOCATION SIMPLE: ABDOMEN
LOCATION SIMPLE: NOSE

## 2021-11-05 ASSESSMENT — LOCATION ZONE DERM
LOCATION ZONE: NOSE
LOCATION ZONE: TRUNK

## 2021-11-05 NOTE — PROCEDURE: LIQUID NITROGEN
Render Note In Bullet Format When Appropriate: No
Post-Care Instructions: I reviewed with the patient in detail post-care instructions. Patient is to wear sunprotection, and avoid picking at any of the treated lesions. Pt may apply Vaseline to crusted or scabbing areas.
Consent: The patient's consent was obtained including but not limited to risks of crusting, scabbing, blistering, scarring, darker or lighter pigmentary change, recurrence, incomplete removal and infection.
Duration Of Freeze Thaw-Cycle (Seconds): 2
Number Of Freeze-Thaw Cycles: 1 freeze-thaw cycle
Show Applicator Variable?: Yes
Detail Level: Detailed

## 2021-12-08 ENCOUNTER — APPOINTMENT (OUTPATIENT)
Dept: URBAN - METROPOLITAN AREA CLINIC 200 | Age: 76
Setting detail: DERMATOLOGY
End: 2021-12-13

## 2021-12-08 DIAGNOSIS — D485 NEOPLASM OF UNCERTAIN BEHAVIOR OF SKIN: ICD-10-CM

## 2021-12-08 DIAGNOSIS — Z11.52 ENCOUNTER FOR SCREENING FOR COVID-19: ICD-10-CM

## 2021-12-08 PROBLEM — D48.5 NEOPLASM OF UNCERTAIN BEHAVIOR OF SKIN: Status: ACTIVE | Noted: 2021-12-08

## 2021-12-08 PROBLEM — J30.1 ALLERGIC RHINITIS DUE TO POLLEN: Status: ACTIVE | Noted: 2021-12-08

## 2021-12-08 PROBLEM — E78.5 HYPERLIPIDEMIA, UNSPECIFIED: Status: ACTIVE | Noted: 2021-12-08

## 2021-12-08 PROCEDURE — 11102 TANGNTL BX SKIN SINGLE LES: CPT

## 2021-12-08 PROCEDURE — OTHER PHOTO-DOCUMENTATION: OTHER

## 2021-12-08 PROCEDURE — OTHER SCREENING FOR COVID-19: OTHER

## 2021-12-08 PROCEDURE — OTHER BIOPSY BY SHAVE METHOD: OTHER

## 2021-12-08 ASSESSMENT — LOCATION ZONE DERM
LOCATION ZONE: LIP
LOCATION ZONE: TRUNK

## 2021-12-08 ASSESSMENT — LOCATION SIMPLE DESCRIPTION DERM
LOCATION SIMPLE: RIGHT LIP
LOCATION SIMPLE: ABDOMEN

## 2021-12-08 ASSESSMENT — LOCATION DETAILED DESCRIPTION DERM
LOCATION DETAILED: PERIUMBILICAL SKIN
LOCATION DETAILED: RIGHT UPPER CUTANEOUS LIP

## 2021-12-08 NOTE — PROCEDURE: BIOPSY BY SHAVE METHOD
Post-Care Instructions: I reviewed with the patient in detail post-care instructions. Patient is to keep the biopsy site dry overnight, and then apply bacitracin twice daily until healed. Patient may apply hydrogen peroxide soaks to remove any crusting.
Anesthesia Type: 0.5% lidocaine with 1:200,000 epinephrine
Biopsy Method: sterile single edge surgical blade
Render Path Notes In Note?: No
Electrodesiccation Text: The wound bed was treated with electrodesiccation after the biopsy was performed.
Curettage Text: The wound bed was treated with curettage after the biopsy was performed.
Information: Selecting Yes will display possible errors in your note based on the variables you have selected. This validation is only offered as a suggestion for you. PLEASE NOTE THAT THE VALIDATION TEXT WILL BE REMOVED WHEN YOU FINALIZE YOUR NOTE. IF YOU WANT TO FAX A PRELIMINARY NOTE YOU WILL NEED TO TOGGLE THIS TO 'NO' IF YOU DO NOT WANT IT IN YOUR FAXED NOTE.
Dressing: bandage
Was A Bandage Applied: Yes
Biopsy Type: H and E
X Size Of Lesion In Cm: 0
Electrodesiccation And Curettage Text: The wound bed was treated with electrodesiccation and curettage after the biopsy was performed.
Notification Instructions: Patient will be notified of biopsy results. However, patient instructed to call the office if not contacted within 2 weeks.
Type Of Destruction Used: Curettage
Wound Care: Aquaphor
Depth Of Biopsy: dermis
Cryotherapy Text: The wound bed was treated with cryotherapy after the biopsy was performed.
Anesthesia Volume In Cc (Will Not Render If 0): 0.5
Billing Type: Third-Party Bill
Hemostasis: Drysol
Detail Level: Detailed
Silver Nitrate Text: The wound bed was treated with silver nitrate after the biopsy was performed.
Consent: Written consent was obtained and risks were reviewed including but not limited to scarring, infection, bleeding, scabbing, incomplete removal, nerve damage and allergy to anesthesia.

## 2022-01-25 ENCOUNTER — CONSULT (OUTPATIENT)
Dept: PRIMARY CARE | Facility: CLINIC | Age: 77
End: 2022-01-25
Payer: MEDICARE

## 2022-01-25 VITALS
WEIGHT: 174.5 LBS | TEMPERATURE: 97.8 F | OXYGEN SATURATION: 97 % | RESPIRATION RATE: 18 BRPM | DIASTOLIC BLOOD PRESSURE: 80 MMHG | BODY MASS INDEX: 29.07 KG/M2 | HEART RATE: 71 BPM | HEIGHT: 65 IN | SYSTOLIC BLOOD PRESSURE: 140 MMHG

## 2022-01-25 DIAGNOSIS — Z01.818 PREOPERATIVE CLEARANCE: Primary | ICD-10-CM

## 2022-01-25 DIAGNOSIS — E78.00 PURE HYPERCHOLESTEROLEMIA: ICD-10-CM

## 2022-01-25 DIAGNOSIS — R73.01 IMPAIRED FASTING GLUCOSE: ICD-10-CM

## 2022-01-25 DIAGNOSIS — I47.10 PAROXYSMAL SUPRAVENTRICULAR TACHYCARDIA (CMS/HCC): ICD-10-CM

## 2022-01-25 DIAGNOSIS — H35.373 EPIRETINAL MEMBRANE (ERM) OF BOTH EYES: ICD-10-CM

## 2022-01-25 DIAGNOSIS — C43.9 MALIGNANT MELANOMA, UNSPECIFIED SITE (CMS/HCC): ICD-10-CM

## 2022-01-25 PROBLEM — H26.9 LEFT CATARACT: Status: RESOLVED | Noted: 2018-11-05 | Resolved: 2022-01-25

## 2022-01-25 PROBLEM — S76.011A HIP STRAIN, RIGHT, INITIAL ENCOUNTER: Status: RESOLVED | Noted: 2021-09-21 | Resolved: 2022-01-25

## 2022-01-25 PROBLEM — Z00.00 ENCOUNTER FOR MEDICARE ANNUAL WELLNESS EXAM: Status: RESOLVED | Noted: 2021-09-03 | Resolved: 2022-01-25

## 2022-01-25 PROBLEM — M25.512 ACUTE PAIN OF BOTH SHOULDERS: Status: RESOLVED | Noted: 2019-11-22 | Resolved: 2022-01-25

## 2022-01-25 PROBLEM — M25.511 ACUTE PAIN OF BOTH SHOULDERS: Status: RESOLVED | Noted: 2019-11-22 | Resolved: 2022-01-25

## 2022-01-25 PROCEDURE — 93000 ELECTROCARDIOGRAM COMPLETE: CPT | Performed by: NURSE PRACTITIONER

## 2022-01-25 PROCEDURE — 99214 OFFICE O/P EST MOD 30 MIN: CPT | Mod: 25 | Performed by: NURSE PRACTITIONER

## 2022-01-25 RX ORDER — OFLOXACIN 3 MG/ML
SOLUTION/ DROPS OPHTHALMIC
COMMUNITY
Start: 2022-01-05 | End: 2022-10-12 | Stop reason: ALTCHOICE

## 2022-01-25 RX ORDER — PREDNISOLONE ACETATE 10 MG/ML
SUSPENSION/ DROPS OPHTHALMIC
COMMUNITY
Start: 2022-01-05 | End: 2022-10-12 | Stop reason: ALTCHOICE

## 2022-01-25 ASSESSMENT — ENCOUNTER SYMPTOMS
HEMATOLOGIC/LYMPHATIC NEGATIVE: 1
CARDIOVASCULAR NEGATIVE: 1
MUSCULOSKELETAL NEGATIVE: 1
NEUROLOGICAL NEGATIVE: 1
CONSTITUTIONAL NEGATIVE: 1
RESPIRATORY NEGATIVE: 1
ALLERGIC/IMMUNOLOGIC NEGATIVE: 1
PSYCHIATRIC NEGATIVE: 1
GASTROINTESTINAL NEGATIVE: 1
ENDOCRINE NEGATIVE: 1

## 2022-01-25 ASSESSMENT — PATIENT HEALTH QUESTIONNAIRE - PHQ9: SUM OF ALL RESPONSES TO PHQ9 QUESTIONS 1 & 2: 0

## 2022-01-25 NOTE — PROGRESS NOTES
"[facility] at [address]  [place] [address]  [phone]  Fax [phone]      Patient ID: Bev Martinez                              : 1945    Visit Date: 2022    Chief Complaint: Pre-op Exam         Patient ID: Bev Martinez is a 76 y.o. female.    Patient Active Problem List   Diagnosis   • Allergic rhinitis   • Calculus of kidney   • Esophagitis, reflux   • Fatty liver   • History of colon polyps   • Impaired fasting glucose   • Overweight   • Paroxysmal supraventricular tachycardia (CMS/HCC)   • Pure hypercholesterolemia   • Malignant melanoma (CMS/HCC)   • Herpes simplex   • Cystoid macular degeneration of both eyes   • Primary osteoarthritis   • Preoperative clearance   • Epiretinal membrane (ERM) of both eyes         Current Outpatient Medications:   •  aspirin 81 mg enteric coated tablet, Take 81 mg by mouth 2 (two) times a week (Mon, Thu). Monday and Thursday, Disp: , Rfl:   •  desloratadine (CLARINEX) 5 mg tablet, Take 1 tablet (5 mg total) by mouth daily., Disp: 90 tablet, Rfl: 3  •  famotidine (PEPCID) 40 mg tablet, Take 1 tablet (40 mg total) by mouth 2 (two) times a day., Disp: 180 tablet, Rfl: 3  •  latanoprost (XALATAN) 0.005 % ophthalmic solution, Administer 1 drop into both eyes daily.  , Disp: , Rfl:   •  metoprolol succinate XL (TOPROL XL) 50 mg 24 hr tablet, Take 1 tablet (50 mg total) by mouth daily., Disp: 90 tablet, Rfl: 3  •  ofloxacin 0.3 % ophthalmic solution, , Disp: , Rfl:   •  prednisoLONE acetate 1 % ophthalmic suspension, , Disp: , Rfl:   •  timolol (TIMOPTIC-XR) 0.5 % ophthalmic gel-forming, Administer 1 drop into both eyes daily.  , Disp: , Rfl:   •  atorvastatin (LIPITOR) 10 mg tablet, Take 1 tablet (10 mg total) by mouth nightly., Disp: 90 tablet, Rfl: 3    Allergies   Allergen Reactions   • Adhesive Tape-Silicones Other (see comments)     \"Skin Burn\"   • Ambien [Zolpidem] Other (see comments) "     Amnesia   • Dexilant [Dexlansoprazole] GI intolerance   • Sulfa (Sulfonamide Antibiotics) Rash       Social History     Tobacco Use   • Smoking status: Never Smoker   • Smokeless tobacco: Never Used   Substance Use Topics   • Alcohol use: No   • Drug use: No       Health Maintenance   Topic Date Due   • DEXA Scan  01/25/2023 (Originally 1945)   • COVID-19 Vaccine (4 - Booster for Moderna series) 03/31/2022   • Medicare Annual Wellness Visit  09/03/2022   • Breast Cancer Screening  05/25/2023   • DTaP, Tdap, and Td Vaccines (2 - Td or Tdap) 12/03/2029   • Annual Falls Risk Screening  Completed   • Zoster Vaccine  Completed   • Influenza Vaccine  Completed   • Hepatitis C Screening  Completed   • Pneumococcal  Completed   • Pneumococcal (65 years and older)  Completed   • Meningococcal ACWY  Aged Out   • HIB Vaccines  Aged Out   • IPV Vaccines  Aged Out   • HPV Vaccines  Aged Out       HPI  Pre-op clearance for Vitrectomy left eye on 2/4/2022 @ [facility].  Dr Miller.  Will do R eye in March 2022( has to have 6 weeks between surgeries)  Local anesthesia.  Needs EKG--last one 2018.  Denies CP, SOB, palpitaitons.  No issues with anesthesia in the past.      The following have been reviewed and updated as appropriate in this visit:   Allergies  Meds  Problems         Review of System  Review of Systems   Constitutional: Negative.    HENT: Negative.    Eyes: Positive for visual disturbance.   Respiratory: Negative.    Cardiovascular: Negative.    Gastrointestinal: Negative.    Endocrine: Negative.    Genitourinary: Negative.    Musculoskeletal: Negative.    Skin: Negative.    Allergic/Immunologic: Negative.    Neurological: Negative.    Hematological: Negative.    Psychiatric/Behavioral: Negative.        Objective     Vitals  Vitals:    01/25/22 0934   BP: 140/80   BP Location: Left upper arm   Patient Position: Sitting   Pulse: 71   Resp: 18   Temp: 36.6 °C (97.8 °F)   TempSrc: Oral   SpO2: 97%  "  Weight: 79.2 kg (174 lb 8 oz)   Height: 1.651 m (5' 5\")     Body mass index is 29.04 kg/m².    Physical Exam  Physical Exam  Vitals reviewed.   Constitutional:       General: She is not in acute distress.     Appearance: Normal appearance. She is not diaphoretic.   HENT:      Head: Normocephalic.      Right Ear: Tympanic membrane, ear canal and external ear normal.      Left Ear: Tympanic membrane, ear canal and external ear normal.      Nose: Nose normal.      Mouth/Throat:      Mouth: Mucous membranes are moist.      Pharynx: Oropharynx is clear.   Eyes:      General:         Right eye: No discharge.         Left eye: No discharge.      Extraocular Movements: Extraocular movements intact.      Conjunctiva/sclera: Conjunctivae normal.      Pupils: Pupils are equal, round, and reactive to light.   Neck:      Vascular: No carotid bruit.   Cardiovascular:      Rate and Rhythm: Normal rate.      Heart sounds: No murmur heard.    No friction rub. No gallop.   Pulmonary:      Effort: Pulmonary effort is normal.      Breath sounds: Normal breath sounds. No wheezing, rhonchi or rales.   Abdominal:      General: Bowel sounds are normal. There is no distension.      Palpations: Abdomen is soft. There is no mass.      Tenderness: There is no abdominal tenderness. There is no right CVA tenderness or left CVA tenderness.   Musculoskeletal:      Cervical back: Neck supple. No rigidity or tenderness.      Right lower leg: No edema.      Left lower leg: No edema.   Lymphadenopathy:      Cervical: No cervical adenopathy.   Skin:     General: Skin is warm and dry.      Coloration: Skin is not pale.      Findings: No erythema or rash.   Neurological:      General: No focal deficit present.      Mental Status: She is alert and oriented to person, place, and time.      Gait: Gait normal.      Deep Tendon Reflexes: Reflexes normal.   Psychiatric:         Mood and Affect: Mood and affect normal.         Speech: Speech normal.         " Behavior: Behavior normal.         Assessment/Plan     Problem List Items Addressed This Visit     Impaired fasting glucose     Stable.  Last A1C 6.0.         Paroxysmal supraventricular tachycardia (CMS/HCC)     One episode when she had a UTI.  Never recurred.  Stable on B blocker.         Pure hypercholesterolemia     Stable on statin daily.  Labs UTD.         Malignant melanoma (CMS/HCC)     Dr Land follows  No recurrence.         Preoperative clearance - Primary     Cleared for planned procedure.  EKG without change.         Relevant Orders    ECG 12 LEAD OFFICE PERFORMED (Completed)    Epiretinal membrane (ERM) of both eyes     Cleared for surgery.  L eye 2/4/2022  R eye March 2022.  Dr Miller.         Relevant Medications    ofloxacin 0.3 % ophthalmic solution    prednisoLONE acetate 1 % ophthalmic suspension              MARIANNE Gandhi  1/25/2022

## 2022-01-25 NOTE — ASSESSMENT & PLAN NOTE
Cleared for planned procedure.  EKG without change.   Pt sched for excision of right posterior neck mass on 11/25 @ [facility]

## 2022-02-18 DIAGNOSIS — J30.9 ALLERGIC RHINITIS, UNSPECIFIED SEASONALITY, UNSPECIFIED TRIGGER: ICD-10-CM

## 2022-02-18 DIAGNOSIS — E78.00 PURE HYPERCHOLESTEROLEMIA: ICD-10-CM

## 2022-02-18 DIAGNOSIS — I47.10 PAROXYSMAL SUPRAVENTRICULAR TACHYCARDIA (CMS/HCC): ICD-10-CM

## 2022-02-18 RX ORDER — METOPROLOL SUCCINATE 50 MG/1
50 TABLET, EXTENDED RELEASE ORAL DAILY
Qty: 90 TABLET | Refills: 1 | Status: SHIPPED | OUTPATIENT
Start: 2022-02-18 | End: 2022-02-21 | Stop reason: SDUPTHER

## 2022-02-18 RX ORDER — DESLORATADINE 5 MG/1
5 TABLET ORAL DAILY
Qty: 90 TABLET | Refills: 1 | Status: SHIPPED | OUTPATIENT
Start: 2022-02-18 | End: 2022-02-21 | Stop reason: SDUPTHER

## 2022-02-18 RX ORDER — ATORVASTATIN CALCIUM 10 MG/1
10 TABLET, FILM COATED ORAL NIGHTLY
Qty: 90 TABLET | Refills: 1 | Status: SHIPPED | OUTPATIENT
Start: 2022-02-18 | End: 2022-02-21 | Stop reason: SDUPTHER

## 2022-02-21 DIAGNOSIS — I47.10 PAROXYSMAL SUPRAVENTRICULAR TACHYCARDIA (CMS/HCC): ICD-10-CM

## 2022-02-21 DIAGNOSIS — E78.00 PURE HYPERCHOLESTEROLEMIA: ICD-10-CM

## 2022-02-21 DIAGNOSIS — J30.9 ALLERGIC RHINITIS, UNSPECIFIED SEASONALITY, UNSPECIFIED TRIGGER: ICD-10-CM

## 2022-02-21 RX ORDER — METOPROLOL SUCCINATE 50 MG/1
50 TABLET, EXTENDED RELEASE ORAL DAILY
Qty: 90 TABLET | Refills: 1 | Status: SHIPPED | OUTPATIENT
Start: 2022-02-21 | End: 2022-07-15

## 2022-02-21 RX ORDER — DESLORATADINE 5 MG/1
5 TABLET ORAL DAILY
Qty: 90 TABLET | Refills: 1 | Status: SHIPPED | OUTPATIENT
Start: 2022-02-21 | End: 2022-07-15

## 2022-02-21 RX ORDER — ATORVASTATIN CALCIUM 10 MG/1
10 TABLET, FILM COATED ORAL NIGHTLY
Qty: 90 TABLET | Refills: 1 | Status: SHIPPED | OUTPATIENT
Start: 2022-02-21 | End: 2022-10-12 | Stop reason: SDUPTHER

## 2022-02-21 NOTE — TELEPHONE ENCOUNTER
Medicine Refill Request    Last Office: 9/21/2021   Last Consult Visit: 1/25/2022  Last Telemedicine Visit: Visit date not found    Next Appointment: 3/3/2022    Patients medications were sent to the wrong pharmacy    Current Outpatient Medications:   •  aspirin 81 mg enteric coated tablet, Take 81 mg by mouth 2 (two) times a week (Mon, Thu). Monday and Thursday, Disp: , Rfl:   •  atorvastatin (LIPITOR) 10 mg tablet, Take 1 tablet (10 mg total) by mouth nightly., Disp: 90 tablet, Rfl: 1  •  desloratadine (CLARINEX) 5 mg tablet, Take 1 tablet (5 mg total) by mouth daily., Disp: 90 tablet, Rfl: 1  •  famotidine (PEPCID) 40 mg tablet, Take 1 tablet (40 mg total) by mouth 2 (two) times a day., Disp: 180 tablet, Rfl: 3  •  latanoprost (XALATAN) 0.005 % ophthalmic solution, Administer 1 drop into both eyes daily.  , Disp: , Rfl:   •  metoprolol succinate XL (TOPROL XL) 50 mg 24 hr tablet, Take 1 tablet (50 mg total) by mouth daily., Disp: 90 tablet, Rfl: 1  •  ofloxacin 0.3 % ophthalmic solution, , Disp: , Rfl:   •  prednisoLONE acetate 1 % ophthalmic suspension, , Disp: , Rfl:   •  timolol (TIMOPTIC-XR) 0.5 % ophthalmic gel-forming, Administer 1 drop into both eyes daily.  , Disp: , Rfl:       BP Readings from Last 3 Encounters:   01/25/22 140/80   09/21/21 140/76   09/03/21 134/82       Recent Lab results:  No results found for: CHOL, No results found for: HDL, No results found for: LDLCALC, No results found for: TRIG     Lab Results   Component Value Date    GLUCOSE 122 (H) 11/05/2018   ,   Lab Results   Component Value Date    HGBA1C 5.9 08/11/2021         Lab Results   Component Value Date    CREATININE 0.9 11/05/2018       No results found for: TSH

## 2022-03-03 ENCOUNTER — CONSULT (OUTPATIENT)
Dept: PRIMARY CARE | Facility: CLINIC | Age: 77
End: 2022-03-03
Payer: MEDICARE

## 2022-03-03 VITALS
OXYGEN SATURATION: 97 % | BODY MASS INDEX: 28.76 KG/M2 | TEMPERATURE: 98.2 F | DIASTOLIC BLOOD PRESSURE: 84 MMHG | RESPIRATION RATE: 18 BRPM | WEIGHT: 172.6 LBS | HEART RATE: 72 BPM | SYSTOLIC BLOOD PRESSURE: 138 MMHG | HEIGHT: 65 IN

## 2022-03-03 DIAGNOSIS — H35.373 EPIRETINAL MEMBRANE (ERM) OF BOTH EYES: ICD-10-CM

## 2022-03-03 DIAGNOSIS — I47.10 PAROXYSMAL SUPRAVENTRICULAR TACHYCARDIA (CMS/HCC): ICD-10-CM

## 2022-03-03 DIAGNOSIS — Z01.818 PREOPERATIVE CLEARANCE: Primary | ICD-10-CM

## 2022-03-03 DIAGNOSIS — K21.00 ESOPHAGITIS, REFLUX: ICD-10-CM

## 2022-03-03 DIAGNOSIS — E78.00 PURE HYPERCHOLESTEROLEMIA: ICD-10-CM

## 2022-03-03 DIAGNOSIS — C43.9 MALIGNANT MELANOMA, UNSPECIFIED SITE (CMS/HCC): ICD-10-CM

## 2022-03-03 DIAGNOSIS — R73.01 IMPAIRED FASTING GLUCOSE: ICD-10-CM

## 2022-03-03 PROCEDURE — 99213 OFFICE O/P EST LOW 20 MIN: CPT | Performed by: NURSE PRACTITIONER

## 2022-03-03 RX ORDER — FAMOTIDINE 40 MG/1
40 TABLET, FILM COATED ORAL
Qty: 180 TABLET | Refills: 3 | Status: SHIPPED | OUTPATIENT
Start: 2022-03-03 | End: 2022-10-12 | Stop reason: SDUPTHER

## 2022-03-03 ASSESSMENT — ENCOUNTER SYMPTOMS
RESPIRATORY NEGATIVE: 1
CONSTITUTIONAL NEGATIVE: 1
HEMATOLOGIC/LYMPHATIC NEGATIVE: 1
PSYCHIATRIC NEGATIVE: 1
EYES NEGATIVE: 1
CARDIOVASCULAR NEGATIVE: 1
GASTROINTESTINAL NEGATIVE: 1
NEUROLOGICAL NEGATIVE: 1
ALLERGIC/IMMUNOLOGIC NEGATIVE: 1
ENDOCRINE NEGATIVE: 1
MUSCULOSKELETAL NEGATIVE: 1

## 2022-03-03 ASSESSMENT — PATIENT HEALTH QUESTIONNAIRE - PHQ9: SUM OF ALL RESPONSES TO PHQ9 QUESTIONS 1 & 2: 0

## 2022-03-03 NOTE — PROGRESS NOTES
"[facility] at [address]  [place] [address]  [phone]  Fax [phone]      Patient ID: Bev Martinez                              : 1945    Visit Date: 3/3/2022    Chief Complaint: Pre-op Exam         Patient ID: Bev Martinez is a 77 y.o. female.    Patient Active Problem List   Diagnosis   • Allergic rhinitis   • Calculus of kidney   • Esophagitis, reflux   • Fatty liver   • History of colon polyps   • Impaired fasting glucose   • Overweight   • Paroxysmal supraventricular tachycardia (CMS/HCC)   • Pure hypercholesterolemia   • Malignant melanoma (CMS/HCC)   • Herpes simplex   • Cystoid macular degeneration of both eyes   • Primary osteoarthritis   • Preoperative clearance   • Epiretinal membrane (ERM) of both eyes         Current Outpatient Medications:   •  aspirin 81 mg enteric coated tablet, Take 81 mg by mouth 2 (two) times a week (Mon, Thu). Monday and Thursday, Disp: , Rfl:   •  atorvastatin (LIPITOR) 10 mg tablet, Take 1 tablet (10 mg total) by mouth nightly., Disp: 90 tablet, Rfl: 1  •  desloratadine (CLARINEX) 5 mg tablet, Take 1 tablet (5 mg total) by mouth daily., Disp: 90 tablet, Rfl: 1  •  famotidine (PEPCID) 40 mg tablet, Take 1 tablet (40 mg total) by mouth 2 (two) times a day., Disp: 180 tablet, Rfl: 3  •  latanoprost (XALATAN) 0.005 % ophthalmic solution, Administer 1 drop into both eyes daily.  , Disp: , Rfl:   •  metoprolol succinate XL (TOPROL XL) 50 mg 24 hr tablet, Take 1 tablet (50 mg total) by mouth daily., Disp: 90 tablet, Rfl: 1  •  ofloxacin 0.3 % ophthalmic solution, , Disp: , Rfl:   •  prednisoLONE acetate 1 % ophthalmic suspension, , Disp: , Rfl:   •  timolol (TIMOPTIC-XR) 0.5 % ophthalmic gel-forming, Administer 1 drop into both eyes daily.  , Disp: , Rfl:     Allergies   Allergen Reactions   • Adhesive Tape-Silicones Other (see comments)     \"Skin Burn\"   • Ambien [Zolpidem] Other (see comments) "     Amnesia   • Dexilant [Dexlansoprazole] GI intolerance   • Sulfa (Sulfonamide Antibiotics) Rash       Social History     Tobacco Use   • Smoking status: Never Smoker   • Smokeless tobacco: Never Used   Substance Use Topics   • Alcohol use: No   • Drug use: No       Health Maintenance   Topic Date Due   • DEXA Scan  01/25/2023 (Originally 1945)   • COVID-19 Vaccine (4 - Booster for Moderna series) 03/31/2022   • Medicare Annual Wellness Visit  09/03/2022   • Breast Cancer Screening  05/25/2023   • DTaP, Tdap, and Td Vaccines (2 - Td or Tdap) 12/03/2029   • Annual Falls Risk Screening  Completed   • Zoster Vaccine  Completed   • Influenza Vaccine  Completed   • Hepatitis C Screening  Completed   • Pneumococcal  Completed   • Pneumococcal (65 years and older)  Completed   • Meningococcal ACWY  Aged Out   • HIB Vaccines  Aged Out   • IPV Vaccines  Aged Out   • HPV Vaccines  Aged Out     Past Surgical History:   Procedure Laterality Date   • ABDOMINAL SURGERY  07/2012    Small Bowel Resection   • CATARACT EXTRACTION Right 01/2016   • KNEE CARTILAGE SURGERY Left 11/2008   • SKIN CANCER EXCISION Left 2009    Melanoma   • TUBAL LIGATION     • VITRECTOMY Left 02/2022     Family History   Problem Relation Age of Onset   • Heart disease Biological Mother    • COPD Biological Father    • Glaucoma Biological Sister    • Heart disease Biological Brother    • Diabetes Biological Brother    • Glaucoma Biological Brother    • Stroke Maternal Grandfather    • Glaucoma Biological Sister    • Eating disorder Biological Sister    • Glaucoma Biological Brother        HPI  Preoperative clearance for R eye surgery (vitrectomy)  [facility] March [facility] 2022  [facility] [place]  Had L eye done last month.  Vision remains blurry.  Can take a year to heal.  No changes since her last visit here.      The following have been reviewed and updated as appropriate in this visit:   Allergies  Meds  Problems         Review  "of System  Review of Systems   Constitutional: Negative.    HENT: Negative.    Eyes: Negative.    Respiratory: Negative.    Cardiovascular: Negative.    Gastrointestinal: Negative.    Endocrine: Negative.    Genitourinary: Negative.    Musculoskeletal: Negative.    Skin: Negative.    Allergic/Immunologic: Negative.    Neurological: Negative.    Hematological: Negative.    Psychiatric/Behavioral: Negative.        Objective     Vitals  Vitals:    03/03/22 0901   BP: 138/84   BP Location: Left upper arm   Patient Position: Sitting   Pulse: 72   Resp: 18   Temp: 36.8 °C (98.2 °F)   TempSrc: Oral   SpO2: 97%   Weight: 78.3 kg (172 lb 9.6 oz)   Height: 1.652 m (5' 5.04\")     Body mass index is 28.69 kg/m².    Physical Exam  Physical Exam  Vitals reviewed.   Constitutional:       General: She is not in acute distress.     Appearance: Normal appearance. She is not diaphoretic.   HENT:      Right Ear: Tympanic membrane, ear canal and external ear normal.      Left Ear: Tympanic membrane, ear canal and external ear normal.      Mouth/Throat:      Mouth: Mucous membranes are moist.      Pharynx: Oropharynx is clear. No oropharyngeal exudate or posterior oropharyngeal erythema.   Neck:      Vascular: No carotid bruit.   Cardiovascular:      Rate and Rhythm: Normal rate and regular rhythm.      Heart sounds: No murmur heard.    No friction rub. No gallop.   Pulmonary:      Effort: Pulmonary effort is normal.      Breath sounds: Normal breath sounds. No wheezing, rhonchi or rales.   Abdominal:      General: Bowel sounds are normal. There is no distension.      Palpations: Abdomen is soft. There is no mass.      Tenderness: There is no abdominal tenderness. There is no right CVA tenderness or left CVA tenderness.   Musculoskeletal:      Cervical back: Neck supple. No rigidity or tenderness.      Right lower leg: No edema.      Left lower leg: No edema.   Lymphadenopathy:      Cervical: No cervical adenopathy.   Neurological:      " General: No focal deficit present.      Mental Status: She is alert and oriented to person, place, and time.      Gait: Gait normal.         Assessment/Plan     Problem List Items Addressed This Visit     Esophagitis, reflux     Pepcid renewed today.  Stable.         Relevant Medications    famotidine (PEPCID) 40 mg tablet    Impaired fasting glucose     Stable.  Last A1C 6.0.         Paroxysmal supraventricular tachycardia (CMS/HCC)     Stable on B blocker.         Pure hypercholesterolemia     Stable on statin.  Labs UTD.         Malignant melanoma (CMS/HCC)     Sees Dr Land every 6 months without fail.  No recurrence.         Preoperative clearance - Primary     Cleared for planned procedure.         Epiretinal membrane (ERM) of both eyes     Cleared for planned procedure R eye.  Dr Miller.                   MARIANNE Gandhi  3/3/2022

## 2022-03-14 ENCOUNTER — APPOINTMENT (OUTPATIENT)
Dept: URBAN - METROPOLITAN AREA CLINIC 200 | Age: 77
Setting detail: DERMATOLOGY
End: 2022-03-16

## 2022-03-14 DIAGNOSIS — Z85.828 PERSONAL HISTORY OF OTHER MALIGNANT NEOPLASM OF SKIN: ICD-10-CM

## 2022-03-14 DIAGNOSIS — L57.8 OTHER SKIN CHANGES DUE TO CHRONIC EXPOSURE TO NONIONIZING RADIATION: ICD-10-CM

## 2022-03-14 DIAGNOSIS — Z85.820 PERSONAL HISTORY OF MALIGNANT MELANOMA OF SKIN: ICD-10-CM

## 2022-03-14 DIAGNOSIS — D485 NEOPLASM OF UNCERTAIN BEHAVIOR OF SKIN: ICD-10-CM

## 2022-03-14 DIAGNOSIS — Z11.52 ENCOUNTER FOR SCREENING FOR COVID-19: ICD-10-CM

## 2022-03-14 PROBLEM — E78.5 HYPERLIPIDEMIA, UNSPECIFIED: Status: ACTIVE | Noted: 2022-03-14

## 2022-03-14 PROBLEM — D48.5 NEOPLASM OF UNCERTAIN BEHAVIOR OF SKIN: Status: ACTIVE | Noted: 2022-03-14

## 2022-03-14 PROCEDURE — OTHER BIOPSY BY SHAVE METHOD: OTHER

## 2022-03-14 PROCEDURE — 99213 OFFICE O/P EST LOW 20 MIN: CPT | Mod: 25

## 2022-03-14 PROCEDURE — OTHER SCREENING FOR COVID-19: OTHER

## 2022-03-14 PROCEDURE — OTHER PHOTO-DOCUMENTATION: OTHER

## 2022-03-14 PROCEDURE — OTHER COUNSELING: OTHER

## 2022-03-14 PROCEDURE — OTHER SUNSCREEN RECOMMENDATIONS: OTHER

## 2022-03-14 PROCEDURE — 11102 TANGNTL BX SKIN SINGLE LES: CPT

## 2022-03-14 PROCEDURE — OTHER MIPS QUALITY: OTHER

## 2022-03-14 ASSESSMENT — LOCATION DETAILED DESCRIPTION DERM
LOCATION DETAILED: LEFT INFERIOR ANTERIOR NECK
LOCATION DETAILED: LEFT MEDIAL SUPERIOR CHEST
LOCATION DETAILED: LEFT PROXIMAL POSTERIOR THIGH
LOCATION DETAILED: PERIUMBILICAL SKIN

## 2022-03-14 ASSESSMENT — LOCATION SIMPLE DESCRIPTION DERM
LOCATION SIMPLE: ABDOMEN
LOCATION SIMPLE: LEFT ANTERIOR NECK
LOCATION SIMPLE: LEFT POSTERIOR THIGH
LOCATION SIMPLE: CHEST

## 2022-03-14 ASSESSMENT — LOCATION ZONE DERM
LOCATION ZONE: TRUNK
LOCATION ZONE: NECK
LOCATION ZONE: LEG

## 2022-03-14 NOTE — PROCEDURE: BIOPSY BY SHAVE METHOD
Silver Nitrate Text: The wound bed was treated with silver nitrate after the biopsy was performed.
Render Post-Care Instructions In Note?: no
Additional Anesthesia Volume In Cc (Will Not Render If 0): 0
Hemostasis: Drysol
Electrodesiccation Text: The wound bed was treated with electrodesiccation after the biopsy was performed.
Notification Instructions: Patient will be notified of biopsy results. However, patient instructed to call the office if not contacted within 2 weeks.
Detail Level: Detailed
Anesthesia Type: diphenhydramine
Biopsy Method: Dermablade
Billing Type: Third-Party Bill
Curettage Text: The wound bed was treated with curettage after the biopsy was performed.
Dressing: bandage
Electrodesiccation And Curettage Text: The wound bed was treated with electrodesiccation and curettage after the biopsy was performed.
Validate Note Data (See Information Below): Yes
Biopsy Type: H and E
Consent: Written consent was obtained and risks were reviewed including but not limited to scarring, infection, bleeding, scabbing, incomplete removal, nerve damage and allergy to anesthesia.
Post-Care Instructions: I reviewed with the patient in detail post-care instructions. Patient is to keep the biopsy site dry overnight, and then apply bacitracin twice daily until healed. Patient may apply hydrogen peroxide soaks to remove any crusting.
Cryotherapy Text: The wound bed was treated with cryotherapy after the biopsy was performed.
Type Of Destruction Used: Curettage
Wound Care: Aquaphor
Information: Selecting Yes will display possible errors in your note based on the variables you have selected. This validation is only offered as a suggestion for you. PLEASE NOTE THAT THE VALIDATION TEXT WILL BE REMOVED WHEN YOU FINALIZE YOUR NOTE. IF YOU WANT TO FAX A PRELIMINARY NOTE YOU WILL NEED TO TOGGLE THIS TO 'NO' IF YOU DO NOT WANT IT IN YOUR FAXED NOTE.
Depth Of Biopsy: dermis
Anesthesia Volume In Cc (Will Not Render If 0): 0.2

## 2022-09-14 ENCOUNTER — APPOINTMENT (OUTPATIENT)
Dept: URBAN - METROPOLITAN AREA CLINIC 203 | Age: 77
Setting detail: DERMATOLOGY
End: 2022-09-18

## 2022-09-14 DIAGNOSIS — Z85.820 PERSONAL HISTORY OF MALIGNANT MELANOMA OF SKIN: ICD-10-CM

## 2022-09-14 DIAGNOSIS — L57.8 OTHER SKIN CHANGES DUE TO CHRONIC EXPOSURE TO NONIONIZING RADIATION: ICD-10-CM

## 2022-09-14 DIAGNOSIS — L57.0 ACTINIC KERATOSIS: ICD-10-CM

## 2022-09-14 DIAGNOSIS — Z85.828 PERSONAL HISTORY OF OTHER MALIGNANT NEOPLASM OF SKIN: ICD-10-CM

## 2022-09-14 DIAGNOSIS — Z11.52 ENCOUNTER FOR SCREENING FOR COVID-19: ICD-10-CM

## 2022-09-14 PROCEDURE — OTHER LIQUID NITROGEN: OTHER

## 2022-09-14 PROCEDURE — 99213 OFFICE O/P EST LOW 20 MIN: CPT | Mod: 25

## 2022-09-14 PROCEDURE — OTHER SCREENING FOR COVID-19: OTHER

## 2022-09-14 PROCEDURE — OTHER SUNSCREEN RECOMMENDATIONS: OTHER

## 2022-09-14 PROCEDURE — OTHER COUNSELING: OTHER

## 2022-09-14 PROCEDURE — 17000 DESTRUCT PREMALG LESION: CPT

## 2022-09-14 ASSESSMENT — LOCATION DETAILED DESCRIPTION DERM
LOCATION DETAILED: LEFT PROXIMAL DORSAL FOREARM
LOCATION DETAILED: LEFT MEDIAL BREAST 10-11:00 REGION
LOCATION DETAILED: PERIUMBILICAL SKIN
LOCATION DETAILED: EPIGASTRIC SKIN
LOCATION DETAILED: NASAL ROOT
LOCATION DETAILED: LEFT MEDIAL BREAST 11-12:00 REGION

## 2022-09-14 ASSESSMENT — LOCATION SIMPLE DESCRIPTION DERM
LOCATION SIMPLE: ABDOMEN
LOCATION SIMPLE: LEFT FOREARM
LOCATION SIMPLE: NOSE
LOCATION SIMPLE: LEFT BREAST

## 2022-09-14 ASSESSMENT — LOCATION ZONE DERM
LOCATION ZONE: ARM
LOCATION ZONE: NOSE
LOCATION ZONE: TRUNK

## 2022-09-14 ASSESSMENT — PAIN INTENSITY VAS: HOW INTENSE IS YOUR PAIN 0 BEING NO PAIN, 10 BEING THE MOST SEVERE PAIN POSSIBLE?: NO PAIN

## 2022-09-14 NOTE — PROCEDURE: LIQUID NITROGEN
Show Aperture Variable?: Yes
Render Post-Care Instructions In Note?: no
Detail Level: Detailed
Consent: The patient's consent was obtained including but not limited to risks of crusting, scabbing, blistering, scarring, darker or lighter pigmentary change, recurrence, incomplete removal and infection.
Post-Care Instructions: I reviewed with the patient in detail post-care instructions. Patient is to wear sunprotection, and avoid picking at any of the treated lesions. Pt may apply Vaseline to crusted or scabbing areas.
Duration Of Freeze Thaw-Cycle (Seconds): 0
Application Tool (Optional): Liquid Nitrogen Sprayer

## 2022-10-12 ENCOUNTER — OFFICE VISIT (OUTPATIENT)
Dept: PRIMARY CARE | Facility: CLINIC | Age: 77
End: 2022-10-12
Payer: MEDICARE

## 2022-10-12 VITALS
WEIGHT: 177.4 LBS | BODY MASS INDEX: 29.56 KG/M2 | TEMPERATURE: 98.1 F | SYSTOLIC BLOOD PRESSURE: 140 MMHG | HEIGHT: 65 IN | RESPIRATION RATE: 18 BRPM | OXYGEN SATURATION: 99 % | DIASTOLIC BLOOD PRESSURE: 88 MMHG | HEART RATE: 70 BPM

## 2022-10-12 DIAGNOSIS — I47.10 PAROXYSMAL SUPRAVENTRICULAR TACHYCARDIA (CMS/HCC): ICD-10-CM

## 2022-10-12 DIAGNOSIS — J30.9 ALLERGIC RHINITIS, UNSPECIFIED SEASONALITY, UNSPECIFIED TRIGGER: ICD-10-CM

## 2022-10-12 DIAGNOSIS — Z77.22 EXPOSURE TO SECOND HAND SMOKE: ICD-10-CM

## 2022-10-12 DIAGNOSIS — C43.9 MALIGNANT MELANOMA, UNSPECIFIED SITE (CMS/HCC): ICD-10-CM

## 2022-10-12 DIAGNOSIS — E78.00 PURE HYPERCHOLESTEROLEMIA: ICD-10-CM

## 2022-10-12 DIAGNOSIS — Z23 NEED FOR VACCINATION: ICD-10-CM

## 2022-10-12 DIAGNOSIS — Z80.1 FAMILY HISTORY OF LUNG CANCER: ICD-10-CM

## 2022-10-12 DIAGNOSIS — R73.01 IMPAIRED FASTING GLUCOSE: ICD-10-CM

## 2022-10-12 DIAGNOSIS — K21.00 ESOPHAGITIS, REFLUX: ICD-10-CM

## 2022-10-12 DIAGNOSIS — Z00.00 MEDICARE ANNUAL WELLNESS VISIT, SUBSEQUENT: Primary | ICD-10-CM

## 2022-10-12 PROBLEM — Z01.818 PREOPERATIVE CLEARANCE: Status: RESOLVED | Noted: 2022-01-25 | Resolved: 2022-10-12

## 2022-10-12 PROCEDURE — G0439 PPPS, SUBSEQ VISIT: HCPCS | Mod: 25 | Performed by: NURSE PRACTITIONER

## 2022-10-12 PROCEDURE — 90694 VACC AIIV4 NO PRSRV 0.5ML IM: CPT | Performed by: NURSE PRACTITIONER

## 2022-10-12 PROCEDURE — G0008 ADMIN INFLUENZA VIRUS VAC: HCPCS | Performed by: NURSE PRACTITIONER

## 2022-10-12 RX ORDER — DESLORATADINE 5 MG/1
5 TABLET ORAL
Qty: 90 TABLET | Refills: 3 | Status: SHIPPED | OUTPATIENT
Start: 2022-10-12 | End: 2022-10-12 | Stop reason: SDUPTHER

## 2022-10-12 RX ORDER — FAMOTIDINE 40 MG/1
40 TABLET, FILM COATED ORAL
Qty: 180 TABLET | Refills: 3 | Status: SHIPPED | OUTPATIENT
Start: 2022-10-12 | End: 2022-10-12 | Stop reason: SDUPTHER

## 2022-10-12 RX ORDER — METOPROLOL SUCCINATE 50 MG/1
50 TABLET, EXTENDED RELEASE ORAL
Qty: 90 TABLET | Refills: 3 | Status: SHIPPED | OUTPATIENT
Start: 2022-10-12 | End: 2022-11-30 | Stop reason: SDUPTHER

## 2022-10-12 RX ORDER — BRIMONIDINE TARTRATE 2 MG/ML
SOLUTION/ DROPS OPHTHALMIC
COMMUNITY
Start: 2022-09-03

## 2022-10-12 RX ORDER — FAMOTIDINE 40 MG/1
40 TABLET, FILM COATED ORAL
Qty: 180 TABLET | Refills: 3 | Status: SHIPPED | OUTPATIENT
Start: 2022-10-12 | End: 2023-05-17 | Stop reason: SDUPTHER

## 2022-10-12 RX ORDER — METOPROLOL SUCCINATE 50 MG/1
50 TABLET, EXTENDED RELEASE ORAL
Qty: 90 TABLET | Refills: 3 | Status: SHIPPED | OUTPATIENT
Start: 2022-10-12 | End: 2022-10-12 | Stop reason: SDUPTHER

## 2022-10-12 RX ORDER — ATORVASTATIN CALCIUM 10 MG/1
10 TABLET, FILM COATED ORAL NIGHTLY
Qty: 90 TABLET | Refills: 3 | Status: SHIPPED | OUTPATIENT
Start: 2022-10-12 | End: 2022-10-12 | Stop reason: SDUPTHER

## 2022-10-12 RX ORDER — ATORVASTATIN CALCIUM 10 MG/1
10 TABLET, FILM COATED ORAL NIGHTLY
Qty: 90 TABLET | Refills: 3 | Status: SHIPPED | OUTPATIENT
Start: 2022-10-12 | End: 2022-11-30 | Stop reason: SDUPTHER

## 2022-10-12 RX ORDER — DESLORATADINE 5 MG/1
5 TABLET ORAL
Qty: 90 TABLET | Refills: 3 | Status: SHIPPED | OUTPATIENT
Start: 2022-10-12 | End: 2022-11-30 | Stop reason: SDUPTHER

## 2022-10-12 ASSESSMENT — ENCOUNTER SYMPTOMS
SHORTNESS OF BREATH: 0
MYALGIAS: 0

## 2022-10-12 ASSESSMENT — MINI COG
COMPLETED: YES
TOTAL SCORE: 5

## 2022-10-12 ASSESSMENT — PATIENT HEALTH QUESTIONNAIRE - PHQ9: SUM OF ALL RESPONSES TO PHQ9 QUESTIONS 1 & 2: 0

## 2022-10-12 NOTE — ASSESSMENT & PLAN NOTE
HM discussed UTD.  Labs ordered  Mammogram UTD  Colon screening UTD  Vaccines reviewed.  Flu shot given.  COVID booster in September.  Encouraged continued regular exercise/swimming and lowfat low sugar diet.

## 2022-10-12 NOTE — TELEPHONE ENCOUNTER
Medicine Refill Request    Last Office: 10/12/2022   Last Consult Visit: 3/3/2022  Last Telemedicine Visit: Visit date not found    Next Appointment: 10/17/2023      Current Outpatient Medications:     aspirin 81 mg enteric coated tablet, Take 81 mg by mouth 2 (two) times a week (Mon, Thu). Monday and Thursday, Disp: , Rfl:     atorvastatin (LIPITOR) 10 mg tablet, Take 1 tablet (10 mg total) by mouth nightly., Disp: 90 tablet, Rfl: 3    brimonidine (ALPHAGAN) 0.2 % ophthalmic solution, , Disp: , Rfl:     desloratadine (CLARINEX) 5 mg tablet, Take 1 tablet (5 mg total) by mouth once daily., Disp: 90 tablet, Rfl: 3    famotidine (PEPCID) 40 mg tablet, Take 1 tablet (40 mg total) by mouth 2 (two) times a day., Disp: 180 tablet, Rfl: 3    latanoprost (XALATAN) 0.005 % ophthalmic solution, Administer 1 drop into both eyes daily.  , Disp: , Rfl:     metoprolol succinate XL (TOPROL-XL) 50 mg 24 hr tablet, Take 1 tablet (50 mg total) by mouth once daily., Disp: 90 tablet, Rfl: 3    timolol (TIMOPTIC-XR) 0.5 % ophthalmic gel-forming, Administer 1 drop into both eyes daily.  , Disp: , Rfl:       BP Readings from Last 3 Encounters:   10/12/22 140/88   03/03/22 138/84   01/25/22 140/80       Recent Lab results:  No results found for: CHOL, No results found for: HDL, No results found for: LDLCALC, No results found for: TRIG     Lab Results   Component Value Date    GLUCOSE 122 (H) 11/05/2018   ,   Lab Results   Component Value Date    HGBA1C 5.9 08/11/2021         Lab Results   Component Value Date    CREATININE 0.9 11/05/2018       No results found for: TSH

## 2022-10-12 NOTE — PROGRESS NOTES
[facility] at [address]  [facility] [address]  [phone]  Fax [phone]      Patient ID: Bev Martinez                              : 1945    Visit Date: 10/12/2022    Chief Complaint: Medicare Subsequent Annual Wellness Visit      Subjective     Bev Martinez is a 77 y.o. female who presents for a  Medicare annual wellness exam.     MAW  Routine follow up  Pt wants a screen for lung cancer--her brother new;y dx with small cell CA and dad  from lung cancer. She briefly smoked when she was young but lived in a household of smokers Including her  her entire life.     Hyperlipidemia  This is a chronic problem. The current episode started more than 1 year ago. The problem is controlled. Recent lipid tests were reviewed and are normal. She has no history of chronic renal disease, diabetes, hypothyroidism, liver disease, obesity or nephrotic syndrome. There are no known factors aggravating her hyperlipidemia. Pertinent negatives include no chest pain, myalgias or shortness of breath. Current antihyperlipidemic treatment includes statins. The current treatment provides significant improvement of lipids. There are no compliance problems.        Chief Complaint   Patient presents with    Medicare Subsequent Annual Wellness Visit       Comprehensive Medical and Social History  Patient Active Problem List   Diagnosis    Allergic rhinitis    Calculus of kidney    Esophagitis, reflux    Fatty liver    History of colon polyps    Impaired fasting glucose    Overweight    Paroxysmal supraventricular tachycardia (CMS/HCC)    Pure hypercholesterolemia    Malignant melanoma (CMS/HCC)    Herpes simplex    Medicare annual wellness visit, subsequent    Cystoid macular degeneration of both eyes    Primary osteoarthritis    Epiretinal membrane (ERM) of both eyes     Past Medical History:   Diagnosis Date    Allergic rhinitis   "   Calculus of kidney     Spontaneously passed    Cataracts, bilateral     Cellulitis     right thumb     Chronic pain     Back Pain    Esophagitis, reflux     Essential hypertension, benign     Fatty liver     Glaucoma     H/O cardiovascular stress test     History of colon polyps     Impaired fasting glucose     Blood Glucose usually 115-124 in am    Macular degeneration     Malignant melanoma (CMS/HCC) 2010    Left Arm    Migraines     Last migraine 30 years ago    Overweight     Paroxysmal supraventricular tachycardia (CMS/HCC)     Controlled with Toprol    Prediabetes     Pure hypercholesterolemia     Skin cancer     Squamous cell Face    Small bowel obstruction (CMS/HCC)     Caused by Scar tissue from Tubal Ligation    UTI (urinary tract infection) 04/2018    Develped an SVT with UTI and followed by cardiology     Past Surgical History:   Procedure Laterality Date    ABDOMINAL SURGERY  07/2012    Small Bowel Resection    CATARACT EXTRACTION Right 01/2016    KNEE CARTILAGE SURGERY Left 11/2008    SKIN CANCER EXCISION Left 2009    Melanoma    TUBAL LIGATION      VITRECTOMY Left 02/2022     Allergies   Allergen Reactions    Adhesive Tape-Silicones Other (see comments)     \"Skin Burn\"    Ambien [Zolpidem] Other (see comments)     Amnesia    Dexilant [Dexlansoprazole] GI intolerance    Sulfa (Sulfonamide Antibiotics) Rash     Current Outpatient Medications   Medication Sig Dispense Refill    aspirin 81 mg enteric coated tablet Take 81 mg by mouth 2 (two) times a week (Mon, Thu). Monday and Thursday      atorvastatin (LIPITOR) 10 mg tablet Take 1 tablet (10 mg total) by mouth nightly. 90 tablet 3    brimonidine (ALPHAGAN) 0.2 % ophthalmic solution       desloratadine (CLARINEX) 5 mg tablet Take 1 tablet (5 mg total) by mouth once daily. 90 tablet 3    famotidine (PEPCID) 40 mg tablet Take 1 tablet (40 mg total) by mouth 2 (two) times a day. 180 tablet 3    latanoprost (XALATAN) " "0.005 % ophthalmic solution Administer 1 drop into both eyes daily.        metoprolol succinate XL (TOPROL-XL) 50 mg 24 hr tablet Take 1 tablet (50 mg total) by mouth once daily. 90 tablet 3    timolol (TIMOPTIC-XR) 0.5 % ophthalmic gel-forming Administer 1 drop into both eyes daily.         No current facility-administered medications for this visit.     Family History   Problem Relation Age of Onset    Heart disease Biological Mother     COPD Biological Father     Glaucoma Biological Sister     Heart disease Biological Brother     Diabetes Biological Brother     Glaucoma Biological Brother     Stroke Maternal Grandfather     Glaucoma Biological Sister     Eating disorder Biological Sister     Glaucoma Biological Brother      Social History     Socioeconomic History    Marital status:      Spouse name: None    Number of children: None    Years of education: None    Highest education level: None   Occupational History    Occupation: retired - GYN CRNP   Tobacco Use    Smoking status: Never    Smokeless tobacco: Never   Substance and Sexual Activity    Alcohol use: No    Drug use: No       Objective   Vitals  Vitals:    10/12/22 0752   BP: 140/88   BP Location: Left upper arm   Patient Position: Sitting   Pulse: 70   Resp: 18   Temp: 36.7 °C (98.1 °F)   TempSrc: Temporal   SpO2: 99%   Weight: 80.5 kg (177 lb 6.4 oz)   Height: 1.652 m (5' 5.04\")     Body mass index is 29.49 kg/m².    Review of Systems   Respiratory: Negative for shortness of breath.    Cardiovascular: Negative for chest pain.   Musculoskeletal: Negative for myalgias.       Physical Exam  Vitals reviewed.   Constitutional:       General: She is not in acute distress.     Appearance: Normal appearance. She is not diaphoretic.   Cardiovascular:      Rate and Rhythm: Normal rate and regular rhythm.      Heart sounds: No murmur heard.    No friction rub. No gallop.   Pulmonary:      Effort: Pulmonary effort is normal.      " Breath sounds: Normal breath sounds. No wheezing, rhonchi or rales.   Musculoskeletal:      Cervical back: Neck supple. No rigidity or tenderness.      Right lower leg: No edema.      Left lower leg: No edema.   Lymphadenopathy:      Cervical: No cervical adenopathy.   Neurological:      Mental Status: She is alert and oriented to person, place, and time.         Advanced Care Plan  Does patient have advance directive?: Yes       Patient has Advance Directive: Advance Directive is NOT in chart, requested to bring in   Does patient have current OOH DNR form?: No           Does patient have current POLST?: No             PHQ       PHQ 2 to 9:  Will the patient answer the depression questions?: Y    Little interest or pleasure in doing things: Not at all    Feeling down, depressed, or hopeless: Not at all    Depression Risk: 0    No data recorded  No data recorded  No data recorded  No data recorded  No data recorded  No data recorded  No data recorded  No data recorded  No data recorded  No data recorded                                             Mini Cog  Completed: Yes  Score: 5  Result: Negative    Get Up and Go  Result: Pass            STEADI Falls Risk  One or more falls in the last year: No           Has trouble stepping up onto a curb: No   Advised to use a cane or walker to get around safely: Yes   Often has to rush to the toilet: No   Feels unsteady when walking: No   Has lost some feeling in feet: No   Often feels sad or depressed: No   Steadies self on furniture while walking at home: No   Takes medication that makes him/her feel lightheaded or more tired than usual: No   Worried about falling: No   Takes medicine to sleep or improve mood: No   Needs to push with hands when rising from a chair: No   Falls screen completed: Yes       Hearing and Vision Screening  Vision Screening - Comments:: Regular vision screenings       Assessment/Plan     Problem List Items Addressed This Visit     Allergic rhinitis     Current Assessment & Plan     Stable.  Continue Clarinex daily.         Relevant Medications    desloratadine (CLARINEX) 5 mg tablet    Esophagitis, reflux    Current Assessment & Plan     Stable on Pepcid.  Continue med.         Relevant Medications    famotidine (PEPCID) 40 mg tablet    Impaired fasting glucose    Current Assessment & Plan     Stable. Diet managed.  Check A1C.         Relevant Orders    Hemoglobin A1c    Paroxysmal supraventricular tachycardia (CMS/HCC)    Relevant Medications    metoprolol succinate XL (TOPROL-XL) 50 mg 24 hr tablet    atorvastatin (LIPITOR) 10 mg tablet    Pure hypercholesterolemia    Current Assessment & Plan     Labs ordered.  Continue statin.         Relevant Medications    atorvastatin (LIPITOR) 10 mg tablet    Other Relevant Orders    CBC and Differential    Comprehensive metabolic panel    Lipid panel    Malignant melanoma (CMS/HCC)    Overview     Sees Dr Land.          Current Assessment & Plan     Dr Land sees every 6 months.         Medicare annual wellness visit, subsequent - Primary    Current Assessment & Plan      discussed UTD.  Labs ordered  Mammogram UTD  Colon screening UTD  Vaccines reviewed.  Flu shot given.  COVID booster in September.  Encouraged continued regular exercise/swimming and lowfat low sugar diet.        Other Visit Diagnoses     Need for vaccination        Relevant Orders    Influenza vaccine Quad Adjuvanted 65 and Older (FluAd Quad) (Completed)    Family history of lung cancer        Relevant Orders    CT LUNG SCREENING WITHOUT IV CONTRAST    Exposure to second hand smoke        Relevant Orders    CT LUNG SCREENING WITHOUT IV CONTRAST    Personal history of nicotine dependence        Relevant Orders    CT LUNG SCREENING WITHOUT IV CONTRAST          See Patient Instructions (the written plan) which was given to the patient for PPPS and health risk factors with interventions.           MARIANNE Gandhi  10/12/2022

## 2022-10-24 ENCOUNTER — TRANSCRIBE ORDERS (OUTPATIENT)
Dept: SCHEDULING | Age: 77
End: 2022-10-24

## 2022-10-24 ENCOUNTER — TELEPHONE (OUTPATIENT)
Dept: PRIMARY CARE | Facility: CLINIC | Age: 77
End: 2022-10-24
Payer: MEDICARE

## 2022-10-24 ENCOUNTER — TELEPHONE (OUTPATIENT)
Dept: PULMONOLOGY | Facility: HOSPITAL | Age: 77
End: 2022-10-24
Payer: MEDICARE

## 2022-10-24 VITALS — WEIGHT: 177 LBS | HEIGHT: 65 IN | BODY MASS INDEX: 29.49 KG/M2

## 2022-10-24 DIAGNOSIS — Z77.22 EXPOSURE TO SECONDHAND SMOKE: ICD-10-CM

## 2022-10-24 DIAGNOSIS — Z80.1 FAMILY HISTORY OF MALIGNANT NEOPLASM OF TRACHEA, BRONCHUS AND LUNG: Primary | ICD-10-CM

## 2022-10-24 DIAGNOSIS — Z80.1 FAMILY HISTORY OF MALIGNANT NEOPLASM OF TRACHEA, BRONCHUS, AND LUNG: Primary | ICD-10-CM

## 2022-10-24 DIAGNOSIS — Z77.22 CONTACT WITH AND (SUSPECTED) EXPOSURE TO ENVIRONMENTAL TOBACCO SMOKE (ACUTE) (CHRONIC): ICD-10-CM

## 2022-10-24 NOTE — TELEPHONE ENCOUNTER
On her summary sheet it states she is nicotine dependent. . She has only smoked about 5 cigarettes in her lifetime. She is not nicotine dependent. Please get this off her chart.  Thank you

## 2022-11-04 ENCOUNTER — HOSPITAL ENCOUNTER (OUTPATIENT)
Dept: RADIOLOGY | Age: 77
Discharge: HOME | End: 2022-11-04
Attending: NURSE PRACTITIONER

## 2022-11-04 DIAGNOSIS — Z80.1 FAMILY HISTORY OF LUNG CANCER: ICD-10-CM

## 2022-11-04 DIAGNOSIS — Z77.22 EXPOSURE TO SECOND HAND SMOKE: ICD-10-CM

## 2022-11-04 PROCEDURE — 71271 CT THORAX LUNG CANCER SCR C-: CPT | Mod: MF

## 2022-11-09 ENCOUNTER — TELEPHONE (OUTPATIENT)
Dept: PRIMARY CARE | Facility: CLINIC | Age: 77
End: 2022-11-09
Payer: MEDICARE

## 2022-11-09 NOTE — TELEPHONE ENCOUNTER
Per pt request printed CT lung Screening and lab results.  Pt stated she will be in some time to  at office.

## 2022-11-21 NOTE — HPI: SKIN LESION
Is This A New Presentation, Or A Follow-Up?: Skin Lesion This document is complete and the patient is ready for discharge.

## 2022-11-30 DIAGNOSIS — I47.10 PAROXYSMAL SUPRAVENTRICULAR TACHYCARDIA (CMS/HCC): ICD-10-CM

## 2022-11-30 DIAGNOSIS — J30.9 ALLERGIC RHINITIS, UNSPECIFIED SEASONALITY, UNSPECIFIED TRIGGER: ICD-10-CM

## 2022-11-30 DIAGNOSIS — E78.00 PURE HYPERCHOLESTEROLEMIA: ICD-10-CM

## 2022-11-30 NOTE — TELEPHONE ENCOUNTER
Medicine Refill Request    Last Office: 10/12/2022   Last Consult Visit: 3/3/2022  Last Telemedicine Visit: Visit date not found    Next Appointment: 10/17/2023      Current Outpatient Medications:   •  aspirin 81 mg enteric coated tablet, Take 81 mg by mouth 2 (two) times a week (Mon, Thu). Monday and Thursday, Disp: , Rfl:   •  atorvastatin (LIPITOR) 10 mg tablet, Take 1 tablet (10 mg total) by mouth nightly., Disp: 90 tablet, Rfl: 3  •  brimonidine (ALPHAGAN) 0.2 % ophthalmic solution, , Disp: , Rfl:   •  desloratadine (CLARINEX) 5 mg tablet, Take 1 tablet (5 mg total) by mouth once daily., Disp: 90 tablet, Rfl: 3  •  famotidine (PEPCID) 40 mg tablet, Take 1 tablet (40 mg total) by mouth 2 (two) times a day., Disp: 180 tablet, Rfl: 3  •  latanoprost (XALATAN) 0.005 % ophthalmic solution, Administer 1 drop into both eyes daily.  , Disp: , Rfl:   •  metoprolol succinate XL (TOPROL-XL) 50 mg 24 hr tablet, Take 1 tablet (50 mg total) by mouth once daily., Disp: 90 tablet, Rfl: 3  •  timolol (TIMOPTIC-XR) 0.5 % ophthalmic gel-forming, Administer 1 drop into both eyes daily.  , Disp: , Rfl:       BP Readings from Last 3 Encounters:   10/12/22 140/88   03/03/22 138/84   01/25/22 140/80       Recent Lab results:  No results found for: CHOL, No results found for: HDL, No results found for: LDLCALC, No results found for: TRIG     Lab Results   Component Value Date    GLUCOSE 122 (H) 11/05/2018   ,   Lab Results   Component Value Date    HGBA1C 5.9 08/11/2021         Lab Results   Component Value Date    CREATININE 0.9 11/05/2018       No results found for: TSH

## 2022-12-01 RX ORDER — DESLORATADINE 5 MG/1
5 TABLET ORAL
Qty: 90 TABLET | Refills: 3 | Status: SHIPPED | OUTPATIENT
Start: 2022-12-01

## 2022-12-01 RX ORDER — METOPROLOL SUCCINATE 50 MG/1
50 TABLET, EXTENDED RELEASE ORAL
Qty: 90 TABLET | Refills: 3 | Status: SHIPPED | OUTPATIENT
Start: 2022-12-01

## 2022-12-01 RX ORDER — ATORVASTATIN CALCIUM 10 MG/1
10 TABLET, FILM COATED ORAL NIGHTLY
Qty: 90 TABLET | Refills: 3 | Status: SHIPPED | OUTPATIENT
Start: 2022-12-01 | End: 2023-11-26

## 2023-04-18 ENCOUNTER — APPOINTMENT (OUTPATIENT)
Dept: URBAN - METROPOLITAN AREA CLINIC 203 | Age: 78
Setting detail: DERMATOLOGY
End: 2023-04-23

## 2023-04-18 DIAGNOSIS — Z85.828 PERSONAL HISTORY OF OTHER MALIGNANT NEOPLASM OF SKIN: ICD-10-CM

## 2023-04-18 DIAGNOSIS — L57.8 OTHER SKIN CHANGES DUE TO CHRONIC EXPOSURE TO NONIONIZING RADIATION: ICD-10-CM

## 2023-04-18 DIAGNOSIS — D485 NEOPLASM OF UNCERTAIN BEHAVIOR OF SKIN: ICD-10-CM

## 2023-04-18 DIAGNOSIS — L663 OTHER SPECIFIED DISEASES OF HAIR AND HAIR FOLLICLES: ICD-10-CM

## 2023-04-18 DIAGNOSIS — L738 OTHER SPECIFIED DISEASES OF HAIR AND HAIR FOLLICLES: ICD-10-CM

## 2023-04-18 DIAGNOSIS — Z85.820 PERSONAL HISTORY OF MALIGNANT MELANOMA OF SKIN: ICD-10-CM

## 2023-04-18 DIAGNOSIS — L57.0 ACTINIC KERATOSIS: ICD-10-CM

## 2023-04-18 PROBLEM — L02.821 FURUNCLE OF HEAD [ANY PART, EXCEPT FACE]: Status: ACTIVE | Noted: 2023-04-18

## 2023-04-18 PROBLEM — D48.5 NEOPLASM OF UNCERTAIN BEHAVIOR OF SKIN: Status: ACTIVE | Noted: 2023-04-18

## 2023-04-18 PROCEDURE — 11102 TANGNTL BX SKIN SINGLE LES: CPT

## 2023-04-18 PROCEDURE — OTHER SUNSCREEN RECOMMENDATIONS: OTHER

## 2023-04-18 PROCEDURE — 17000 DESTRUCT PREMALG LESION: CPT | Mod: 59

## 2023-04-18 PROCEDURE — 17003 DESTRUCT PREMALG LES 2-14: CPT

## 2023-04-18 PROCEDURE — 99213 OFFICE O/P EST LOW 20 MIN: CPT | Mod: 25

## 2023-04-18 PROCEDURE — OTHER LIQUID NITROGEN: OTHER

## 2023-04-18 PROCEDURE — OTHER COUNSELING: OTHER

## 2023-04-18 PROCEDURE — OTHER BIOPSY BY SHAVE METHOD: OTHER

## 2023-04-18 ASSESSMENT — LOCATION DETAILED DESCRIPTION DERM
LOCATION DETAILED: RIGHT INFERIOR MEDIAL MALAR CHEEK
LOCATION DETAILED: NASAL DORSUM
LOCATION DETAILED: MIDDLE STERNUM
LOCATION DETAILED: LEFT PROXIMAL DORSAL FOREARM
LOCATION DETAILED: LEFT OCCIPITAL SCALP
LOCATION DETAILED: EPIGASTRIC SKIN
LOCATION DETAILED: LEFT MEDIAL BREAST 11-12:00 REGION
LOCATION DETAILED: LEFT LATERAL ZYGOMA
LOCATION DETAILED: LEFT MEDIAL BREAST 10-11:00 REGION

## 2023-04-18 ASSESSMENT — LOCATION SIMPLE DESCRIPTION DERM
LOCATION SIMPLE: LEFT FOREARM
LOCATION SIMPLE: RIGHT CHEEK
LOCATION SIMPLE: LEFT BREAST
LOCATION SIMPLE: LEFT ZYGOMA
LOCATION SIMPLE: ABDOMEN
LOCATION SIMPLE: POSTERIOR SCALP
LOCATION SIMPLE: NOSE
LOCATION SIMPLE: CHEST

## 2023-04-18 ASSESSMENT — LOCATION ZONE DERM
LOCATION ZONE: SCALP
LOCATION ZONE: ARM
LOCATION ZONE: FACE
LOCATION ZONE: NOSE
LOCATION ZONE: TRUNK

## 2023-04-18 ASSESSMENT — PAIN INTENSITY VAS: HOW INTENSE IS YOUR PAIN 0 BEING NO PAIN, 10 BEING THE MOST SEVERE PAIN POSSIBLE?: NO PAIN

## 2023-04-18 NOTE — PROCEDURE: LIQUID NITROGEN
Duration Of Freeze Thaw-Cycle (Seconds): 5
Show Applicator Variable?: Yes
Application Tool (Optional): Liquid Nitrogen Sprayer
Consent: The patient's consent was obtained including but not limited to risks of crusting, scabbing, blistering, scarring, darker or lighter pigmentary change, recurrence, incomplete removal and infection.
Render Note In Bullet Format When Appropriate: No
Post-Care Instructions: I reviewed with the patient in detail post-care instructions. Patient is to wear sunprotection, and avoid picking at any of the treated lesions. Pt may apply Vaseline to crusted or scabbing areas.
Number Of Freeze-Thaw Cycles: 2 freeze-thaw cycles
Detail Level: Detailed

## 2023-05-17 DIAGNOSIS — K21.00 ESOPHAGITIS, REFLUX: ICD-10-CM

## 2023-05-17 RX ORDER — FAMOTIDINE 40 MG/1
40 TABLET, FILM COATED ORAL
Qty: 180 TABLET | Refills: 3 | Status: SHIPPED | OUTPATIENT
Start: 2023-05-17 | End: 2023-05-19 | Stop reason: SDUPTHER

## 2023-05-19 DIAGNOSIS — K21.00 ESOPHAGITIS, REFLUX: ICD-10-CM

## 2023-05-19 RX ORDER — FAMOTIDINE 40 MG/1
40 TABLET, FILM COATED ORAL
Qty: 180 TABLET | Refills: 3 | Status: SHIPPED | OUTPATIENT
Start: 2023-05-19

## 2023-05-19 NOTE — TELEPHONE ENCOUNTER
Medicine Refill Request    Last Office: 10/12/2022   Last Consult Visit: 3/3/2022  Last Telemedicine Visit: Visit date not found    Next Appointment: 10/17/2023      Current Outpatient Medications:   •  famotidine (PEPCID) 40 mg tablet, Take 1 tablet (40 mg total) by mouth 2 (two) times a day., Disp: 180 tablet, Rfl: 3  •  aspirin 81 mg enteric coated tablet, Take 81 mg by mouth 2 (two) times a week (Mon, Thu). Monday and Thursday, Disp: , Rfl:   •  atorvastatin (LIPITOR) 10 mg tablet, Take 1 tablet (10 mg total) by mouth nightly., Disp: 90 tablet, Rfl: 3  •  brimonidine (ALPHAGAN) 0.2 % ophthalmic solution, , Disp: , Rfl:   •  desloratadine (CLARINEX) 5 mg tablet, Take 1 tablet (5 mg total) by mouth once daily., Disp: 90 tablet, Rfl: 3  •  latanoprost (XALATAN) 0.005 % ophthalmic solution, Administer 1 drop into both eyes daily.  , Disp: , Rfl:   •  metoprolol succinate XL (TOPROL-XL) 50 mg 24 hr tablet, Take 1 tablet (50 mg total) by mouth once daily., Disp: 90 tablet, Rfl: 3  •  timolol (TIMOPTIC-XR) 0.5 % ophthalmic gel-forming, Administer 1 drop into both eyes daily.  , Disp: , Rfl:       BP Readings from Last 3 Encounters:   10/12/22 140/88   03/03/22 138/84   01/25/22 140/80       Recent Lab results:  No results found for: CHOL, No results found for: HDL, No results found for: LDLCALC, No results found for: TRIG     Lab Results   Component Value Date    GLUCOSE 122 (H) 11/05/2018   ,   Lab Results   Component Value Date    HGBA1C 5.9 08/11/2021         Lab Results   Component Value Date    CREATININE 0.9 11/05/2018       No results found for: TSH

## 2023-10-19 ENCOUNTER — APPOINTMENT (OUTPATIENT)
Dept: URBAN - METROPOLITAN AREA CLINIC 203 | Age: 78
Setting detail: DERMATOLOGY
End: 2023-10-19

## 2023-10-19 DIAGNOSIS — L57.8 OTHER SKIN CHANGES DUE TO CHRONIC EXPOSURE TO NONIONIZING RADIATION: ICD-10-CM

## 2023-10-19 DIAGNOSIS — L70.8 OTHER ACNE: ICD-10-CM

## 2023-10-19 DIAGNOSIS — Z85.828 PERSONAL HISTORY OF OTHER MALIGNANT NEOPLASM OF SKIN: ICD-10-CM

## 2023-10-19 DIAGNOSIS — Z85.820 PERSONAL HISTORY OF MALIGNANT MELANOMA OF SKIN: ICD-10-CM

## 2023-10-19 DIAGNOSIS — L29.8 OTHER PRURITUS: ICD-10-CM

## 2023-10-19 DIAGNOSIS — L57.0 ACTINIC KERATOSIS: ICD-10-CM

## 2023-10-19 PROCEDURE — OTHER SUNSCREEN RECOMMENDATIONS: OTHER

## 2023-10-19 PROCEDURE — OTHER LIQUID NITROGEN: OTHER

## 2023-10-19 PROCEDURE — 99213 OFFICE O/P EST LOW 20 MIN: CPT | Mod: 25

## 2023-10-19 PROCEDURE — OTHER REASSURANCE: OTHER

## 2023-10-19 PROCEDURE — 17000 DESTRUCT PREMALG LESION: CPT

## 2023-10-19 PROCEDURE — OTHER COUNSELING: OTHER

## 2023-10-19 ASSESSMENT — LOCATION SIMPLE DESCRIPTION DERM
LOCATION SIMPLE: LEFT BREAST
LOCATION SIMPLE: CHEST
LOCATION SIMPLE: LEFT BUTTOCK
LOCATION SIMPLE: ABDOMEN

## 2023-10-19 ASSESSMENT — LOCATION DETAILED DESCRIPTION DERM
LOCATION DETAILED: LEFT MEDIAL BREAST 11-12:00 REGION
LOCATION DETAILED: LEFT MEDIAL SUPERIOR CHEST
LOCATION DETAILED: LOWER STERNUM
LOCATION DETAILED: LEFT BUTTOCK
LOCATION DETAILED: LEFT MEDIAL BREAST 10-11:00 REGION
LOCATION DETAILED: PERIUMBILICAL SKIN
LOCATION DETAILED: EPIGASTRIC SKIN

## 2023-10-19 ASSESSMENT — LOCATION ZONE DERM: LOCATION ZONE: TRUNK

## 2023-10-19 ASSESSMENT — PAIN INTENSITY VAS: HOW INTENSE IS YOUR PAIN 0 BEING NO PAIN, 10 BEING THE MOST SEVERE PAIN POSSIBLE?: NO PAIN

## 2023-10-19 NOTE — PROCEDURE: LIQUID NITROGEN
Number Of Freeze-Thaw Cycles: 2 freeze-thaw cycles
Render Note In Bullet Format When Appropriate: No
Duration Of Freeze Thaw-Cycle (Seconds): 5
Post-Care Instructions: I reviewed with the patient in detail post-care instructions. Patient is to wear sunprotection, and avoid picking at any of the treated lesions. Pt may apply Vaseline to crusted or scabbing areas.
Consent: The patient's consent was obtained including but not limited to risks of crusting, scabbing, blistering, scarring, darker or lighter pigmentary change, recurrence, incomplete removal and infection.
Detail Level: Zone
Show Aperture Variable?: Yes
Application Tool (Optional): Liquid Nitrogen Sprayer

## 2025-06-19 ENCOUNTER — OFFICE VISIT (OUTPATIENT)
Facility: HOSPITAL | Age: 80
End: 2025-06-19
Payer: MEDICARE

## 2025-06-19 VITALS
HEART RATE: 74 BPM | SYSTOLIC BLOOD PRESSURE: 124 MMHG | DIASTOLIC BLOOD PRESSURE: 80 MMHG | OXYGEN SATURATION: 98 % | BODY MASS INDEX: 30.16 KG/M2 | WEIGHT: 181 LBS | HEIGHT: 65 IN

## 2025-06-19 DIAGNOSIS — E78.00 PURE HYPERCHOLESTEROLEMIA: ICD-10-CM

## 2025-06-19 DIAGNOSIS — M85.80 OSTEOPENIA, UNSPECIFIED LOCATION: ICD-10-CM

## 2025-06-19 DIAGNOSIS — K76.0 FATTY LIVER: ICD-10-CM

## 2025-06-19 DIAGNOSIS — M85.852 OSTEOPENIA OF BOTH HIPS: ICD-10-CM

## 2025-06-19 DIAGNOSIS — M85.851 OSTEOPENIA OF BOTH HIPS: ICD-10-CM

## 2025-06-19 DIAGNOSIS — R73.01 IMPAIRED FASTING GLUCOSE: Primary | ICD-10-CM

## 2025-06-19 PROBLEM — H40.1130 PRIMARY OPEN ANGLE GLAUCOMA (POAG) OF BOTH EYES: Status: ACTIVE | Noted: 2022-05-09

## 2025-06-19 PROBLEM — I25.10 CORONARY ARTERY DISEASE INVOLVING NATIVE CORONARY ARTERY OF NATIVE HEART WITHOUT ANGINA PECTORIS: Status: ACTIVE | Noted: 2025-06-19

## 2025-06-19 PROCEDURE — G2211 COMPLEX E/M VISIT ADD ON: HCPCS | Performed by: INTERNAL MEDICINE

## 2025-06-19 PROCEDURE — 99214 OFFICE O/P EST MOD 30 MIN: CPT | Performed by: INTERNAL MEDICINE

## 2025-06-19 PROCEDURE — G0463 HOSPITAL OUTPT CLINIC VISIT: HCPCS | Performed by: INTERNAL MEDICINE

## 2025-06-19 RX ORDER — BRIMONIDINE TARTRATE, TIMOLOL MALEATE 2; 5 MG/ML; MG/ML
1 SOLUTION/ DROPS OPHTHALMIC EVERY 12 HOURS
COMMUNITY
Start: 2025-02-07

## 2025-06-19 RX ORDER — ROSUVASTATIN CALCIUM 20 MG/1
20 TABLET, COATED ORAL DAILY
COMMUNITY
End: 2025-06-19 | Stop reason: SDUPTHER

## 2025-06-19 RX ORDER — DORZOLAMIDE HCL 20 MG/ML
SOLUTION/ DROPS OPHTHALMIC
COMMUNITY
Start: 2025-05-28

## 2025-06-19 RX ORDER — CHOLECALCIFEROL (VITAMIN D3) 50 MCG
2000 TABLET ORAL DAILY
COMMUNITY

## 2025-06-19 ASSESSMENT — ENCOUNTER SYMPTOMS
SHORTNESS OF BREATH: 0
ARTHRALGIAS: 0
FATIGUE: 0
NAUSEA: 0
JOINT SWELLING: 0
TROUBLE SWALLOWING: 0
DIARRHEA: 0
VOMITING: 0

## 2025-06-19 NOTE — PROGRESS NOTES
[facility] Endocrinology  History & Physical        CHIEF COMPLAINT   Patient Name: Bev Martinez  MR#: [id number]  : 1945  Consultant: Gaston Costello DO     HISTORY OF PRESENT ILLNESS      This is a 80 y.o. female with a past medical history of osteopenia and lipipds   - is on rosuvastain   Dexa - due after 2026  Normal stress test  Has OA and has some pain with exercise but doing what she can  No a lot of cardio but does some weights and yoga  No high risk meds  Fell on ice and fractured shoulder in \  Is on vitamin D    PMH/PSH    FH     SOCHX          PAST MEDICAL AND SURGICAL HISTORY      PMHx:  Past Medical History:   Diagnosis Date    Allergic rhinitis     Calculus of kidney     Spontaneously passed    Cataracts, bilateral     Cellulitis     right thumb     Chronic pain     Back Pain    Esophagitis, reflux     Essential hypertension, benign     Fatty liver     Glaucoma     H/O cardiovascular stress test     History of colon polyps     Impaired fasting glucose     Blood Glucose usually 115-124 in am    Macular degeneration     Malignant melanoma (CMS/HCC)     Left Arm    Migraines     Last migraine 30 years ago    Overweight     Paroxysmal supraventricular tachycardia (CMS/HCC)     Controlled with Toprol    Prediabetes     Pure hypercholesterolemia     Skin cancer     Squamous cell Face    Small bowel obstruction (CMS/HCC)     Caused by Scar tissue from Tubal Ligation    UTI (urinary tract infection) 2018    Develped an SVT with UTI and followed by cardiology        PSHx:  Past Surgical History   Procedure Laterality Date    Abdominal surgery  2012    Small Bowel Resection    Cataract extraction Right 2016    Cataract Surgery Left eye Left 2018    Performed by Gumaro Ramires MD at  OR    Knee cartilage surgery Left 2008    Skin cancer excision Left     Melanoma    Tubal ligation      Vitrectomy Left 2022    Yag Cap left Eye Left 2019     Performed by Gumaro Ramires MD at  OR       MEDICATIONS          Current Outpatient Medications:     aspirin 81 mg enteric coated tablet, Take 81 mg by mouth daily., Disp: , Rfl:     cholecalciferol, vitamin D3, 50 mcg (2,000 unit) tablet, Take 2,000 Units by mouth daily., Disp: , Rfl:     COMBIGAN 0.2-0.5 % ophthalmic solution, 1 drop every 12 hours., Disp: , Rfl:     desloratadine (CLARINEX) 5 mg tablet, Take 1 tablet (5 mg total) by mouth once daily., Disp: 90 tablet, Rfl: 3    dorzolamide (TRUSOPT) 2 % ophthalmic solution, , Disp: , Rfl:     famotidine (PEPCID) 40 mg tablet, Take 1 tablet (40 mg total) by mouth 2 (two) times a day. (Patient taking differently: Take 20 mg by mouth 2 (two) times a day.), Disp: 180 tablet, Rfl: 3    metoprolol succinate XL (TOPROL-XL) 50 mg 24 hr tablet, Take 1 tablet (50 mg total) by mouth once daily., Disp: 90 tablet, Rfl: 3    ALLERGIES        Adhesive tape-silicones, Ambien [zolpidem], Dexilant [dexlansoprazole], and Sulfa (sulfonamide antibiotics)    FAMILY HISTORY        Family History   Problem Relation Name Age of Onset    Heart disease Biological Mother      Lung cancer Biological Father      COPD Biological Father      Glaucoma Biological Sister      Glaucoma Biological Sister      Eating disorder Biological Sister      Heart disease Biological Brother      Diabetes Biological Brother      Glaucoma Biological Brother      Lung cancer Biological Brother Tao Rodriguez     Glaucoma Biological Brother Tao Rodriguez     Stroke Maternal Grandfather         SOCIAL HISTORY        Social History     Socioeconomic History    Marital status:      Spouse name: None    Number of children: None    Years of education: None    Highest education level: None   Occupational History    Occupation: retired - GYN CRNP   Tobacco Use    Smoking status: Never     Passive exposure: Past    Smokeless tobacco: Never    Tobacco comments:     Family hx of lung cancer, heavy 2nd  "hand smoke    Substance and Sexual Activity    Alcohol use: No    Drug use: No       REVIEW OF SYSTEMS        Review of Systems   Constitutional:  Negative for fatigue.   HENT:  Negative for trouble swallowing.    Respiratory:  Negative for shortness of breath.    Cardiovascular:  Negative for chest pain and leg swelling.   Gastrointestinal:  Negative for diarrhea, nausea and vomiting.   Endocrine: Negative for cold intolerance and heat intolerance.   Musculoskeletal:  Negative for arthralgias and joint swelling.       PHYSICAL EXAMINATION        Visit Vitals  /80 (BP Location: Left upper arm, Patient Position: Sitting)   Pulse 74   Ht 1.651 m (5' 5\")   Wt 82.1 kg (181 lb)   SpO2 98%   BMI 30.12 kg/m²     Body mass index is 30.12 kg/m².    Physical Exam  Vitals and nursing note reviewed.   Constitutional:       Appearance: Normal appearance.   Cardiovascular:      Rate and Rhythm: Normal rate and regular rhythm.   Pulmonary:      Effort: Pulmonary effort is normal.      Breath sounds: Normal breath sounds.   Abdominal:      General: Bowel sounds are normal.      Palpations: Abdomen is soft.   Musculoskeletal:         General: Normal range of motion.      Cervical back: Normal range of motion. No tenderness.   Skin:     General: Skin is warm and dry.   Neurological:      General: No focal deficit present.      Mental Status: She is alert and oriented to person, place, and time.         LABS / IMAGING / STUDIES        Labs:    Lab Results   Component Value Date    HGBA1C 5.9 08/11/2021    GLUCOSE 122 (H) 11/05/2018    CREATININE 0.9 11/05/2018    CREATININE 1.0 01/08/2016    BUN 24 (H) 11/05/2018    K 4.1 11/05/2018     11/05/2018    CALCIUM 9.9 11/05/2018        No results found for: \"TSH\"    Imaging:      SCREENING                ASSESSMENT AND PLAN     Please bring your insurance information and a copayment if required by your insurance company.       Assessment & Plan  Impaired fasting glucose     "     Pure hypercholesterolemia         Fatty liver         Osteopenia, unspecified location  This is a 80 y.o. female with a past medical history of osteopenia and lipipds   - is on rosuvastain   Dexa - due after January 2026  Normal stress test  Has OA and has some pain with exercise but doing what she can  No a lot of cardio but does some weights and yoga  No high risk meds  Fell on ice and fractured shoulder in 2009\  Is on vitamin D    Lowest spot -2.0 in left FN  Dexa in winter of 2026  No other changes  Working on diet ith A1c  - now 6.3%   - no metformin yet  Labs to be ordered by other docs.    Decision making for this patient was of moderate complexity   I certify that this encounter meets criteria for , as I serve as the focal point for all needed services, or am treating the patient longitudinally for one or more of the patient's chronic or serious health condition(s).  '      Orders:    DEXA BONE DENSITY; Future    Osteopenia of both hips    Orders:    DEXA BONE DENSITY; Future         Gaston Costello DO  06/19/25  2:07 PM

## 2025-06-19 NOTE — ASSESSMENT & PLAN NOTE
This is a 80 y.o. female with a past medical history of osteopenia and lipipds   - is on rosuvastain   Dexa - due after January 2026  Normal stress test  Has OA and has some pain with exercise but doing what she can  No a lot of cardio but does some weights and yoga  No high risk meds  Fell on ice and fractured shoulder in 2009\  Is on vitamin D    Lowest spot -2.0 in left FN  Dexa in winter of 2026  No other changes  Working on diet ith A1c  - now 6.3%   - no metformin yet  Labs to be ordered by other docs.    Decision making for this patient was of moderate complexity   I certify that this encounter meets criteria for , as I serve as the focal point for all needed services, or am treating the patient longitudinally for one or more of the patient's chronic or serious health condition(s).  '      Orders:    DEXA BONE DENSITY; Future

## (undated) DEVICE — AMVISC PLUS

## (undated) DEVICE — Device

## (undated) DEVICE — PACK OPHTHALMIC CUSTOM

## (undated) DEVICE — SOLN IRRIG STERILE WATER 250ML

## (undated) DEVICE — SHIELD EYE UNIVERSAL

## (undated) DEVICE — COTTON TIPPED APPLICATOR 6 IN WOOD 10/PK

## (undated) DEVICE — LABEL EYES KITS

## (undated) DEVICE — GLOVE 7 1/2 PROTEXIS PI MICRO

## (undated) DEVICE — CARTRIDGE D IOL MONARCH DELIVERY SYSTEM